# Patient Record
Sex: FEMALE | Race: WHITE | HISPANIC OR LATINO | Employment: FULL TIME | URBAN - METROPOLITAN AREA
[De-identification: names, ages, dates, MRNs, and addresses within clinical notes are randomized per-mention and may not be internally consistent; named-entity substitution may affect disease eponyms.]

---

## 2018-06-08 ENCOUNTER — HOSPITAL ENCOUNTER (OUTPATIENT)
Dept: RADIOLOGY | Facility: MEDICAL CENTER | Age: 23
End: 2018-06-08
Attending: OBSTETRICS & GYNECOLOGY
Payer: COMMERCIAL

## 2018-11-27 ENCOUNTER — HOSPITAL ENCOUNTER (OUTPATIENT)
Facility: MEDICAL CENTER | Age: 23
End: 2018-11-27
Attending: PREVENTIVE MEDICINE
Payer: COMMERCIAL

## 2018-11-27 ENCOUNTER — NON-PROVIDER VISIT (OUTPATIENT)
Dept: OCCUPATIONAL MEDICINE | Facility: CLINIC | Age: 23
End: 2018-11-27

## 2018-11-27 DIAGNOSIS — Z02.89 ENCOUNTER FOR OCCUPATIONAL HEALTH EXAMINATION: ICD-10-CM

## 2018-11-27 PROCEDURE — 86480 TB TEST CELL IMMUN MEASURE: CPT | Performed by: PREVENTIVE MEDICINE

## 2018-11-28 LAB
M TB TUBERC IFN-G BLD QL: NEGATIVE
M TB TUBERC IFN-G/MITOGEN IGNF BLD: -0
M TB TUBERC IGNF/MITOGEN IGNF CONTROL: 51.67 [IU]/ML
MITOGEN IGNF BCKGRD COR BLD-ACNC: 0.03 [IU]/ML

## 2019-03-30 ENCOUNTER — HOSPITAL ENCOUNTER (OUTPATIENT)
Dept: LAB | Facility: MEDICAL CENTER | Age: 24
End: 2019-03-30
Attending: FAMILY MEDICINE
Payer: COMMERCIAL

## 2019-03-30 LAB
25(OH)D3 SERPL-MCNC: 15 NG/ML (ref 30–100)
ALBUMIN SERPL BCP-MCNC: 4.3 G/DL (ref 3.2–4.9)
ALBUMIN/GLOB SERPL: 1.4 G/DL
ALP SERPL-CCNC: 74 U/L (ref 30–99)
ALT SERPL-CCNC: 49 U/L (ref 2–50)
ANION GAP SERPL CALC-SCNC: 9 MMOL/L (ref 0–11.9)
AST SERPL-CCNC: 34 U/L (ref 12–45)
BASOPHILS # BLD AUTO: 1.5 % (ref 0–1.8)
BASOPHILS # BLD: 0.12 K/UL (ref 0–0.12)
BILIRUB SERPL-MCNC: 0.5 MG/DL (ref 0.1–1.5)
BUN SERPL-MCNC: 8 MG/DL (ref 8–22)
CALCIUM SERPL-MCNC: 9.3 MG/DL (ref 8.5–10.5)
CHLORIDE SERPL-SCNC: 107 MMOL/L (ref 96–112)
CHOLEST SERPL-MCNC: 155 MG/DL (ref 100–199)
CO2 SERPL-SCNC: 25 MMOL/L (ref 20–33)
CREAT SERPL-MCNC: 0.73 MG/DL (ref 0.5–1.4)
EOSINOPHIL # BLD AUTO: 0.13 K/UL (ref 0–0.51)
EOSINOPHIL NFR BLD: 1.6 % (ref 0–6.9)
ERYTHROCYTE [DISTWIDTH] IN BLOOD BY AUTOMATED COUNT: 42.6 FL (ref 35.9–50)
EST. AVERAGE GLUCOSE BLD GHB EST-MCNC: 108 MG/DL
FSH SERPL-ACNC: 7 MIU/ML
GLOBULIN SER CALC-MCNC: 3 G/DL (ref 1.9–3.5)
GLUCOSE SERPL-MCNC: 87 MG/DL (ref 65–99)
HBA1C MFR BLD: 5.4 % (ref 0–5.6)
HCT VFR BLD AUTO: 46.5 % (ref 37–47)
HDLC SERPL-MCNC: 46 MG/DL
HGB BLD-MCNC: 14.5 G/DL (ref 12–16)
IMM GRANULOCYTES # BLD AUTO: 0.02 K/UL (ref 0–0.11)
IMM GRANULOCYTES NFR BLD AUTO: 0.2 % (ref 0–0.9)
LDLC SERPL CALC-MCNC: 95 MG/DL
LH SERPL-ACNC: 4 IU/L
LYMPHOCYTES # BLD AUTO: 2.87 K/UL (ref 1–4.8)
LYMPHOCYTES NFR BLD: 34.7 % (ref 22–41)
MCH RBC QN AUTO: 26.8 PG (ref 27–33)
MCHC RBC AUTO-ENTMCNC: 31.2 G/DL (ref 33.6–35)
MCV RBC AUTO: 86 FL (ref 81.4–97.8)
MONOCYTES # BLD AUTO: 0.55 K/UL (ref 0–0.85)
MONOCYTES NFR BLD AUTO: 6.7 % (ref 0–13.4)
NEUTROPHILS # BLD AUTO: 4.58 K/UL (ref 2–7.15)
NEUTROPHILS NFR BLD: 55.3 % (ref 44–72)
NRBC # BLD AUTO: 0 K/UL
NRBC BLD-RTO: 0 /100 WBC
PLATELET # BLD AUTO: 372 K/UL (ref 164–446)
PMV BLD AUTO: 10.6 FL (ref 9–12.9)
POTASSIUM SERPL-SCNC: 4.6 MMOL/L (ref 3.6–5.5)
PROT SERPL-MCNC: 7.3 G/DL (ref 6–8.2)
RBC # BLD AUTO: 5.41 M/UL (ref 4.2–5.4)
SODIUM SERPL-SCNC: 141 MMOL/L (ref 135–145)
TRIGL SERPL-MCNC: 68 MG/DL (ref 0–149)
TSH SERPL DL<=0.005 MIU/L-ACNC: 1.23 UIU/ML (ref 0.38–5.33)
WBC # BLD AUTO: 8.3 K/UL (ref 4.8–10.8)

## 2019-03-30 PROCEDURE — 82306 VITAMIN D 25 HYDROXY: CPT

## 2019-03-30 PROCEDURE — 80061 LIPID PANEL: CPT

## 2019-03-30 PROCEDURE — 80053 COMPREHEN METABOLIC PANEL: CPT

## 2019-03-30 PROCEDURE — 85025 COMPLETE CBC W/AUTO DIFF WBC: CPT

## 2019-03-30 PROCEDURE — 83001 ASSAY OF GONADOTROPIN (FSH): CPT

## 2019-03-30 PROCEDURE — 36415 COLL VENOUS BLD VENIPUNCTURE: CPT

## 2019-03-30 PROCEDURE — 83002 ASSAY OF GONADOTROPIN (LH): CPT

## 2019-03-30 PROCEDURE — 84443 ASSAY THYROID STIM HORMONE: CPT

## 2019-03-30 PROCEDURE — 83036 HEMOGLOBIN GLYCOSYLATED A1C: CPT

## 2019-04-12 ENCOUNTER — HOSPITAL ENCOUNTER (OUTPATIENT)
Dept: RADIOLOGY | Facility: MEDICAL CENTER | Age: 24
End: 2019-04-12
Attending: FAMILY MEDICINE
Payer: COMMERCIAL

## 2019-04-12 DIAGNOSIS — N92.1 EXCESSIVE AND FREQUENT MENSTRUATION WITH IRREGULAR CYCLE: ICD-10-CM

## 2019-04-12 PROCEDURE — 76830 TRANSVAGINAL US NON-OB: CPT

## 2019-05-20 ENCOUNTER — NON-PROVIDER VISIT (OUTPATIENT)
Dept: OCCUPATIONAL MEDICINE | Facility: CLINIC | Age: 24
End: 2019-05-20

## 2019-05-20 DIAGNOSIS — Z02.1 PRE-EMPLOYMENT DRUG SCREENING: ICD-10-CM

## 2019-05-20 LAB
AMP AMPHETAMINE: NORMAL
COC COCAINE: NORMAL
INT CON NEG: NORMAL
INT CON POS: NORMAL
MET METHAMPHETAMINES: NORMAL
OPI OPIATES: NORMAL
PCP PHENCYCLIDINE: NORMAL
POC DRUG COMMENT 753798-OCCUPATIONAL HEALTH: NEGATIVE
THC: NORMAL

## 2019-05-20 PROCEDURE — 80305 DRUG TEST PRSMV DIR OPT OBS: CPT | Performed by: PREVENTIVE MEDICINE

## 2019-08-27 ENCOUNTER — NON-PROVIDER VISIT (OUTPATIENT)
Dept: URGENT CARE | Facility: PHYSICIAN GROUP | Age: 24
End: 2019-08-27
Payer: COMMERCIAL

## 2019-08-27 DIAGNOSIS — Z02.1 PRE-EMPLOYMENT DRUG SCREENING: ICD-10-CM

## 2019-08-27 PROCEDURE — 80305 DRUG TEST PRSMV DIR OPT OBS: CPT | Performed by: PHYSICIAN ASSISTANT

## 2019-11-25 ENCOUNTER — EH NON-PROVIDER (OUTPATIENT)
Dept: OCCUPATIONAL MEDICINE | Facility: CLINIC | Age: 24
End: 2019-11-25

## 2019-11-25 ENCOUNTER — HOSPITAL ENCOUNTER (OUTPATIENT)
Facility: MEDICAL CENTER | Age: 24
End: 2019-11-25
Attending: PREVENTIVE MEDICINE
Payer: COMMERCIAL

## 2019-11-25 DIAGNOSIS — Z02.89 VISIT FOR OCCUPATIONAL HEALTH EXAMINATION: ICD-10-CM

## 2019-11-25 DIAGNOSIS — Z02.89 VISIT FOR OCCUPATIONAL HEALTH EXAMINATION: Primary | ICD-10-CM

## 2019-11-25 PROCEDURE — 86480 TB TEST CELL IMMUN MEASURE: CPT | Performed by: PREVENTIVE MEDICINE

## 2019-11-27 LAB
GAMMA INTERFERON BACKGROUND BLD IA-ACNC: 0.05 IU/ML
M TB IFN-G BLD-IMP: NEGATIVE
M TB IFN-G CD4+ BCKGRND COR BLD-ACNC: 0 IU/ML
MITOGEN IGNF BCKGRD COR BLD-ACNC: >10 IU/ML
QFT TB2 - NIL TBQ2: 0 IU/ML

## 2020-05-04 ENCOUNTER — HOSPITAL ENCOUNTER (OUTPATIENT)
Facility: MEDICAL CENTER | Age: 25
End: 2020-05-04
Attending: OBSTETRICS & GYNECOLOGY
Payer: COMMERCIAL

## 2020-05-04 PROCEDURE — 87077 CULTURE AEROBIC IDENTIFY: CPT

## 2020-05-04 PROCEDURE — 87086 URINE CULTURE/COLONY COUNT: CPT

## 2020-05-07 LAB
BACTERIA UR CULT: ABNORMAL
BACTERIA UR CULT: ABNORMAL
SIGNIFICANT IND 70042: ABNORMAL
SITE SITE: ABNORMAL
SOURCE SOURCE: ABNORMAL

## 2020-11-13 ENCOUNTER — NON-PROVIDER VISIT (OUTPATIENT)
Dept: OCCUPATIONAL MEDICINE | Facility: CLINIC | Age: 25
End: 2020-11-13

## 2020-11-13 ENCOUNTER — HOSPITAL ENCOUNTER (OUTPATIENT)
Facility: MEDICAL CENTER | Age: 25
End: 2020-11-13
Attending: PREVENTIVE MEDICINE
Payer: COMMERCIAL

## 2020-11-13 DIAGNOSIS — Z02.89 VISIT FOR OCCUPATIONAL HEALTH EXAMINATION: ICD-10-CM

## 2020-11-13 DIAGNOSIS — Z02.89 VISIT FOR OCCUPATIONAL HEALTH EXAMINATION: Primary | ICD-10-CM

## 2020-11-13 PROCEDURE — 86480 TB TEST CELL IMMUN MEASURE: CPT | Performed by: PREVENTIVE MEDICINE

## 2020-11-17 LAB
GAMMA INTERFERON BACKGROUND BLD IA-ACNC: 0.04 IU/ML
M TB IFN-G BLD-IMP: NEGATIVE
M TB IFN-G CD4+ BCKGRND COR BLD-ACNC: 0.02 IU/ML
MITOGEN IGNF BCKGRD COR BLD-ACNC: >10 IU/ML
QFT TB2 - NIL TBQ2: 0.01 IU/ML

## 2021-11-05 ENCOUNTER — NON-PROVIDER VISIT (OUTPATIENT)
Dept: OCCUPATIONAL MEDICINE | Facility: CLINIC | Age: 26
End: 2021-11-05

## 2021-11-05 ENCOUNTER — HOSPITAL ENCOUNTER (OUTPATIENT)
Facility: MEDICAL CENTER | Age: 26
End: 2021-11-05
Attending: NURSE PRACTITIONER
Payer: COMMERCIAL

## 2021-11-05 DIAGNOSIS — Z02.89 VISIT FOR OCCUPATIONAL HEALTH EXAMINATION: ICD-10-CM

## 2021-11-05 DIAGNOSIS — Z02.89 VISIT FOR OCCUPATIONAL HEALTH EXAMINATION: Primary | ICD-10-CM

## 2021-11-05 PROCEDURE — 86480 TB TEST CELL IMMUN MEASURE: CPT | Performed by: NURSE PRACTITIONER

## 2021-11-08 LAB
GAMMA INTERFERON BACKGROUND BLD IA-ACNC: 0.06 IU/ML
M TB IFN-G BLD-IMP: NEGATIVE
M TB IFN-G CD4+ BCKGRND COR BLD-ACNC: 0.01 IU/ML
MITOGEN IGNF BCKGRD COR BLD-ACNC: >10 IU/ML
QFT TB2 - NIL TBQ2: 0 IU/ML

## 2022-01-03 ENCOUNTER — OFFICE VISIT (OUTPATIENT)
Dept: URGENT CARE | Facility: PHYSICIAN GROUP | Age: 27
End: 2022-01-03
Payer: COMMERCIAL

## 2022-01-03 ENCOUNTER — HOSPITAL ENCOUNTER (OUTPATIENT)
Facility: MEDICAL CENTER | Age: 27
End: 2022-01-03
Attending: FAMILY MEDICINE
Payer: COMMERCIAL

## 2022-01-03 VITALS
RESPIRATION RATE: 16 BRPM | SYSTOLIC BLOOD PRESSURE: 120 MMHG | DIASTOLIC BLOOD PRESSURE: 80 MMHG | WEIGHT: 180 LBS | BODY MASS INDEX: 30.73 KG/M2 | HEIGHT: 64 IN | TEMPERATURE: 97.7 F | HEART RATE: 104 BPM | OXYGEN SATURATION: 98 %

## 2022-01-03 DIAGNOSIS — B34.9 VIRAL ILLNESS: ICD-10-CM

## 2022-01-03 PROCEDURE — 99203 OFFICE O/P NEW LOW 30 MIN: CPT | Performed by: FAMILY MEDICINE

## 2022-01-03 PROCEDURE — 0240U HCHG SARS-COV-2 COVID-19 NFCT DS RESP RNA 3 TRGT MIC: CPT

## 2022-01-03 NOTE — PROGRESS NOTES
"Subjective     Mony Kidd is a 26 y.o. female who presents with Body Aches (cough/sore throat/chills/nausea/vomiting/headaches/chest pain when coughing/Negative covid test)  Symptoms started 3 days ago.  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  Had a negative Covid test over there and was sent over for PCR testing.  Review of system otherwise negative.          HPI    ROS           Objective     /80   Pulse (!) 104   Temp 36.5 °C (97.7 °F) (Temporal)   Resp 16   Ht 1.626 m (5' 4\")   Wt 81.6 kg (180 lb)   SpO2 98%   BMI 30.90 kg/m²      Physical Exam  Constitutional:       General: She is not in acute distress.     Appearance: She is not ill-appearing, toxic-appearing or diaphoretic.   HENT:      Head: Atraumatic.      Right Ear: External ear normal.      Left Ear: External ear normal.      Nose: No rhinorrhea.      Mouth/Throat:      Mouth: Mucous membranes are moist.      Pharynx: Oropharynx is clear. No oropharyngeal exudate or posterior oropharyngeal erythema.   Cardiovascular:      Rate and Rhythm: Normal rate and regular rhythm.      Heart sounds: No murmur heard.  No friction rub. No gallop.    Pulmonary:      Effort: Pulmonary effort is normal. No respiratory distress.      Breath sounds: No stridor. No wheezing, rhonchi or rales.   Musculoskeletal:      Cervical back: Neck supple.   Lymphadenopathy:      Cervical: No cervical adenopathy.   Skin:     Coloration: Skin is not jaundiced or pale.   Neurological:      Mental Status: She is alert and oriented to person, place, and time.   Psychiatric:         Mood and Affect: Mood normal.         Behavior: Behavior normal.                Assessment & Plan        1. Viral illness  - CoV-2 and Flu A/B by PCR (24 hour In-House): Collect NP swab in VTM; Future         Warning signs reviewed  Follow up if not significantly improved as expected, sooner if any worsening or new symptoms  Continue symptomatic care          " <-- Click to add NO pertinent Family History

## 2022-01-04 LAB
FLUAV RNA SPEC QL NAA+PROBE: NEGATIVE
FLUBV RNA SPEC QL NAA+PROBE: NEGATIVE
SARS-COV-2 RNA RESP QL NAA+PROBE: NOTDETECTED
SPECIMEN SOURCE: NORMAL

## 2024-05-08 ENCOUNTER — HOSPITAL ENCOUNTER (OUTPATIENT)
Facility: MEDICAL CENTER | Age: 29
End: 2024-05-08
Attending: OBSTETRICS & GYNECOLOGY
Payer: COMMERCIAL

## 2024-05-08 LAB
ABO GROUP BLD: NORMAL
DHEA-S SERPL-MCNC: 336 UG/DL (ref 98.8–340)
PROLACTIN SERPL-MCNC: 6.91 NG/ML (ref 2.8–26)
RH BLD: NORMAL
RUBV AB SER QL: 16.8 IU/ML
TESTOST SERPL-MCNC: 39 NG/DL (ref 9–75)
TSH SERPL DL<=0.005 MIU/L-ACNC: 2 UIU/ML (ref 0.38–5.33)

## 2024-05-09 ENCOUNTER — HOSPITAL ENCOUNTER (OUTPATIENT)
Dept: LAB | Facility: MEDICAL CENTER | Age: 29
End: 2024-05-09
Attending: OBSTETRICS & GYNECOLOGY
Payer: COMMERCIAL

## 2024-05-11 LAB
GLUCOSE 1H P CHAL SERPL-MCNC: 160 MG/DL (ref 65–199)
GLUCOSE 2H P CHAL SERPL-MCNC: 101 MG/DL (ref 65–139)
GLUCOSE BS SERPL-MCNC: 86 MG/DL (ref 65–99)
INSULIN 1H P CHAL SERPL-ACNC: 371 UIU/ML (ref 29–88)
INSULIN 2H P CHAL SERPL-ACNC: 629 UIU/ML (ref 22–79)
INSULIN P FAST SERPL-ACNC: 39 UIU/ML (ref 3–25)
MIS SERPL-MCNC: 3.72 NG/ML (ref 0.18–11.71)

## 2024-06-19 ENCOUNTER — HOSPITAL ENCOUNTER (OUTPATIENT)
Dept: LAB | Facility: MEDICAL CENTER | Age: 29
End: 2024-06-19
Attending: OBSTETRICS & GYNECOLOGY
Payer: COMMERCIAL

## 2024-06-19 LAB
C TRACH DNA SPEC QL NAA+PROBE: NEGATIVE
HBV SURFACE AB SERPL IA-ACNC: <3.5 MIU/ML (ref 0–10)
HBV SURFACE AG SER QL: NORMAL
HCV AB SER QL: NORMAL
HIV 1+2 AB+HIV1 P24 AG SERPL QL IA: NORMAL
N GONORRHOEA DNA SPEC QL NAA+PROBE: NEGATIVE
SPECIMEN SOURCE: NORMAL
T PALLIDUM AB SER QL IA: NORMAL

## 2024-06-19 PROCEDURE — 36415 COLL VENOUS BLD VENIPUNCTURE: CPT

## 2024-06-19 PROCEDURE — 87491 CHLMYD TRACH DNA AMP PROBE: CPT

## 2024-06-19 PROCEDURE — 87340 HEPATITIS B SURFACE AG IA: CPT

## 2024-06-19 PROCEDURE — 86706 HEP B SURFACE ANTIBODY: CPT

## 2024-06-19 PROCEDURE — 86803 HEPATITIS C AB TEST: CPT

## 2024-06-19 PROCEDURE — 87389 HIV-1 AG W/HIV-1&-2 AB AG IA: CPT

## 2024-06-19 PROCEDURE — 86780 TREPONEMA PALLIDUM: CPT

## 2024-06-19 PROCEDURE — 87591 N.GONORRHOEAE DNA AMP PROB: CPT

## 2024-07-17 ENCOUNTER — HOSPITAL ENCOUNTER (OUTPATIENT)
Dept: LAB | Facility: MEDICAL CENTER | Age: 29
End: 2024-07-17
Attending: OBSTETRICS & GYNECOLOGY
Payer: COMMERCIAL

## 2024-07-17 LAB — B-HCG SERPL-ACNC: <1 MIU/ML (ref 0–5)

## 2024-07-17 PROCEDURE — 36415 COLL VENOUS BLD VENIPUNCTURE: CPT

## 2024-07-17 PROCEDURE — 84702 CHORIONIC GONADOTROPIN TEST: CPT

## 2024-12-10 ENCOUNTER — HOSPITAL ENCOUNTER (OUTPATIENT)
Dept: LAB | Facility: MEDICAL CENTER | Age: 29
End: 2024-12-10
Attending: OBSTETRICS & GYNECOLOGY
Payer: COMMERCIAL

## 2024-12-10 LAB — B-HCG SERPL-ACNC: 73.2 MIU/ML (ref 0–5)

## 2024-12-10 PROCEDURE — 36415 COLL VENOUS BLD VENIPUNCTURE: CPT

## 2024-12-10 PROCEDURE — 84702 CHORIONIC GONADOTROPIN TEST: CPT

## 2024-12-12 ENCOUNTER — HOSPITAL ENCOUNTER (OUTPATIENT)
Dept: LAB | Facility: MEDICAL CENTER | Age: 29
End: 2024-12-12
Attending: OBSTETRICS & GYNECOLOGY
Payer: COMMERCIAL

## 2024-12-12 LAB — B-HCG SERPL-ACNC: 198 MIU/ML (ref 0–5)

## 2024-12-12 PROCEDURE — 84702 CHORIONIC GONADOTROPIN TEST: CPT

## 2024-12-12 PROCEDURE — 36415 COLL VENOUS BLD VENIPUNCTURE: CPT

## 2025-02-07 ENCOUNTER — HOSPITAL ENCOUNTER (OUTPATIENT)
Dept: LAB | Facility: MEDICAL CENTER | Age: 30
End: 2025-02-07
Attending: OBSTETRICS & GYNECOLOGY
Payer: COMMERCIAL

## 2025-02-07 LAB
ABO GROUP BLD: NORMAL
BLD GP AB SCN SERPL QL: NORMAL
ERYTHROCYTE [DISTWIDTH] IN BLOOD BY AUTOMATED COUNT: 42.3 FL (ref 35.9–50)
EST. AVERAGE GLUCOSE BLD GHB EST-MCNC: 114 MG/DL
GLUCOSE 1H P 50 G GLC PO SERPL-MCNC: 161 MG/DL (ref 70–139)
HBA1C MFR BLD: 5.6 % (ref 4–5.6)
HBV SURFACE AG SER QL: ABNORMAL
HCT VFR BLD AUTO: 43.1 % (ref 37–47)
HCV AB SER QL: ABNORMAL
HGB BLD-MCNC: 13.7 G/DL (ref 12–16)
HIV 1+2 AB+HIV1 P24 AG SERPL QL IA: NORMAL
MCH RBC QN AUTO: 27 PG (ref 27–33)
MCHC RBC AUTO-ENTMCNC: 31.8 G/DL (ref 32.2–35.5)
MCV RBC AUTO: 84.8 FL (ref 81.4–97.8)
PLATELET # BLD AUTO: 292 K/UL (ref 164–446)
PMV BLD AUTO: 11.5 FL (ref 9–12.9)
RBC # BLD AUTO: 5.08 M/UL (ref 4.2–5.4)
RH BLD: NORMAL
RUBV AB SER QL: 12.2 IU/ML
T PALLIDUM AB SER QL IA: ABNORMAL
WBC # BLD AUTO: 11 K/UL (ref 4.8–10.8)

## 2025-02-07 PROCEDURE — 86762 RUBELLA ANTIBODY: CPT

## 2025-02-07 PROCEDURE — 82950 GLUCOSE TEST: CPT

## 2025-02-07 PROCEDURE — 86780 TREPONEMA PALLIDUM: CPT

## 2025-02-07 PROCEDURE — 86900 BLOOD TYPING SEROLOGIC ABO: CPT

## 2025-02-07 PROCEDURE — 85027 COMPLETE CBC AUTOMATED: CPT

## 2025-02-07 PROCEDURE — 36415 COLL VENOUS BLD VENIPUNCTURE: CPT

## 2025-02-07 PROCEDURE — 87086 URINE CULTURE/COLONY COUNT: CPT

## 2025-02-07 PROCEDURE — 83036 HEMOGLOBIN GLYCOSYLATED A1C: CPT

## 2025-02-07 PROCEDURE — 87340 HEPATITIS B SURFACE AG IA: CPT

## 2025-02-07 PROCEDURE — 86787 VARICELLA-ZOSTER ANTIBODY: CPT | Mod: 91

## 2025-02-07 PROCEDURE — 87389 HIV-1 AG W/HIV-1&-2 AB AG IA: CPT

## 2025-02-07 PROCEDURE — 86850 RBC ANTIBODY SCREEN: CPT

## 2025-02-07 PROCEDURE — 86803 HEPATITIS C AB TEST: CPT

## 2025-02-07 PROCEDURE — 86901 BLOOD TYPING SEROLOGIC RH(D): CPT

## 2025-02-09 LAB
BACTERIA UR CULT: NORMAL
SIGNIFICANT IND 70042: NORMAL
SITE SITE: NORMAL
SOURCE SOURCE: NORMAL

## 2025-02-10 LAB
VZV IGG SER IA-ACNC: 9 S/CO
VZV IGM SER IA-ACNC: 0.72 ISR

## 2025-02-14 ENCOUNTER — HOSPITAL ENCOUNTER (OUTPATIENT)
Dept: LAB | Facility: MEDICAL CENTER | Age: 30
End: 2025-02-14
Attending: OBSTETRICS & GYNECOLOGY
Payer: COMMERCIAL

## 2025-02-15 ENCOUNTER — HOSPITAL ENCOUNTER (OUTPATIENT)
Dept: LAB | Facility: MEDICAL CENTER | Age: 30
End: 2025-02-15
Attending: OBSTETRICS & GYNECOLOGY
Payer: COMMERCIAL

## 2025-02-15 LAB
GLUCOSE 1H P CHAL SERPL-MCNC: 170 MG/DL (ref 65–180)
GLUCOSE 2H P CHAL SERPL-MCNC: 175 MG/DL (ref 65–155)
GLUCOSE 3H P CHAL SERPL-MCNC: 138 MG/DL (ref 65–140)
GLUCOSE BS SERPL-MCNC: 81 MG/DL (ref 65–95)

## 2025-02-15 PROCEDURE — 36415 COLL VENOUS BLD VENIPUNCTURE: CPT

## 2025-02-15 PROCEDURE — 82952 GTT-ADDED SAMPLES: CPT

## 2025-02-15 PROCEDURE — 82951 GLUCOSE TOLERANCE TEST (GTT): CPT

## 2025-04-30 ENCOUNTER — APPOINTMENT (OUTPATIENT)
Dept: RADIOLOGY | Facility: MEDICAL CENTER | Age: 30
End: 2025-04-30
Attending: OBSTETRICS & GYNECOLOGY
Payer: COMMERCIAL

## 2025-04-30 ENCOUNTER — HOSPITAL ENCOUNTER (INPATIENT)
Facility: MEDICAL CENTER | Age: 30
LOS: 3 days | End: 2025-05-03
Attending: OBSTETRICS & GYNECOLOGY | Admitting: OBSTETRICS & GYNECOLOGY
Payer: COMMERCIAL

## 2025-04-30 PROBLEM — O47.00 PRETERM CONTRACTIONS: Status: ACTIVE | Noted: 2025-04-30

## 2025-04-30 LAB
A1 MICROGLOB PLACENTAL VAG QL: NEGATIVE
ABO GROUP BLD: NORMAL
APPEARANCE UR: ABNORMAL
BACTERIA GENITAL QL WET PREP: NORMAL
BASOPHILS # BLD AUTO: 0.4 % (ref 0–1.8)
BASOPHILS # BLD: 0.07 K/UL (ref 0–0.12)
BILIRUB UR QL STRIP.AUTO: NEGATIVE
BLD GP AB SCN SERPL QL: NORMAL
COLOR UR AUTO: ABNORMAL
EOSINOPHIL # BLD AUTO: 0.02 K/UL (ref 0–0.51)
EOSINOPHIL NFR BLD: 0.1 % (ref 0–6.9)
ERYTHROCYTE [DISTWIDTH] IN BLOOD BY AUTOMATED COUNT: 39.5 FL (ref 35.9–50)
GLUCOSE BLD STRIP.AUTO-MCNC: 115 MG/DL (ref 65–99)
GLUCOSE UR QL STRIP.AUTO: NEGATIVE MG/DL
GP B STREP DNA SPEC QL NAA+PROBE: POSITIVE
HCT VFR BLD AUTO: 37.5 % (ref 37–47)
HGB BLD-MCNC: 12.2 G/DL (ref 12–16)
IMM GRANULOCYTES # BLD AUTO: 0.15 K/UL (ref 0–0.11)
IMM GRANULOCYTES NFR BLD AUTO: 0.8 % (ref 0–0.9)
KETONES UR QL STRIP.AUTO: NEGATIVE MG/DL
LEUKOCYTE ESTERASE UR QL STRIP.AUTO: ABNORMAL
LYMPHOCYTES # BLD AUTO: 1.73 K/UL (ref 1–4.8)
LYMPHOCYTES NFR BLD: 9 % (ref 22–41)
MCH RBC QN AUTO: 26.4 PG (ref 27–33)
MCHC RBC AUTO-ENTMCNC: 32.5 G/DL (ref 32.2–35.5)
MCV RBC AUTO: 81.2 FL (ref 81.4–97.8)
MONOCYTES # BLD AUTO: 0.89 K/UL (ref 0–0.85)
MONOCYTES NFR BLD AUTO: 4.6 % (ref 0–13.4)
NEUTROPHILS # BLD AUTO: 16.43 K/UL (ref 1.82–7.42)
NEUTROPHILS NFR BLD: 85.1 % (ref 44–72)
NITRITE UR QL STRIP.AUTO: NEGATIVE
NRBC # BLD AUTO: 0 K/UL
NRBC BLD-RTO: 0 /100 WBC (ref 0–0.2)
PH UR STRIP.AUTO: 6 [PH] (ref 5–8)
PLATELET # BLD AUTO: 320 K/UL (ref 164–446)
PMV BLD AUTO: 11 FL (ref 9–12.9)
PROT UR QL STRIP: 100 MG/DL
RBC # BLD AUTO: 4.62 M/UL (ref 4.2–5.4)
RBC UR QL AUTO: ABNORMAL
RH BLD: NORMAL
SIGNIFICANT IND 70042: NORMAL
SITE SITE: NORMAL
SOURCE SOURCE: NORMAL
SP GR UR STRIP.AUTO: 1.02 (ref 1–1.03)
T PALLIDUM AB SER QL IA: NORMAL
UROBILINOGEN UR STRIP.AUTO-MCNC: 1 MG/DL
WBC # BLD AUTO: 19.3 K/UL (ref 4.8–10.8)

## 2025-04-30 PROCEDURE — 36415 COLL VENOUS BLD VENIPUNCTURE: CPT

## 2025-04-30 PROCEDURE — 86901 BLOOD TYPING SEROLOGIC RH(D): CPT

## 2025-04-30 PROCEDURE — 86850 RBC ANTIBODY SCREEN: CPT

## 2025-04-30 PROCEDURE — A9270 NON-COVERED ITEM OR SERVICE: HCPCS | Performed by: OBSTETRICS & GYNECOLOGY

## 2025-04-30 PROCEDURE — 700105 HCHG RX REV CODE 258: Performed by: OBSTETRICS & GYNECOLOGY

## 2025-04-30 PROCEDURE — 99284 EMERGENCY DEPT VISIT MOD MDM: CPT

## 2025-04-30 PROCEDURE — 81002 URINALYSIS NONAUTO W/O SCOPE: CPT

## 2025-04-30 PROCEDURE — 302790 HCHG STAT ANTEPARTUM CARE, DAILY

## 2025-04-30 PROCEDURE — 82962 GLUCOSE BLOOD TEST: CPT

## 2025-04-30 PROCEDURE — 700101 HCHG RX REV CODE 250: Performed by: OBSTETRICS & GYNECOLOGY

## 2025-04-30 PROCEDURE — 87150 DNA/RNA AMPLIFIED PROBE: CPT

## 2025-04-30 PROCEDURE — 76816 OB US FOLLOW-UP PER FETUS: CPT

## 2025-04-30 PROCEDURE — 700111 HCHG RX REV CODE 636 W/ 250 OVERRIDE (IP)

## 2025-04-30 PROCEDURE — 86780 TREPONEMA PALLIDUM: CPT

## 2025-04-30 PROCEDURE — 76817 TRANSVAGINAL US OBSTETRIC: CPT

## 2025-04-30 PROCEDURE — 85025 COMPLETE CBC W/AUTO DIFF WBC: CPT

## 2025-04-30 PROCEDURE — 770002 HCHG ROOM/CARE - OB PRIVATE (112)

## 2025-04-30 PROCEDURE — 87077 CULTURE AEROBIC IDENTIFY: CPT

## 2025-04-30 PROCEDURE — 700111 HCHG RX REV CODE 636 W/ 250 OVERRIDE (IP): Performed by: OBSTETRICS & GYNECOLOGY

## 2025-04-30 PROCEDURE — 700102 HCHG RX REV CODE 250 W/ 637 OVERRIDE(OP): Performed by: OBSTETRICS & GYNECOLOGY

## 2025-04-30 PROCEDURE — 84112 EVAL AMNIOTIC FLUID PROTEIN: CPT

## 2025-04-30 PROCEDURE — 86900 BLOOD TYPING SEROLOGIC ABO: CPT

## 2025-04-30 PROCEDURE — 87086 URINE CULTURE/COLONY COUNT: CPT

## 2025-04-30 PROCEDURE — 87081 CULTURE SCREEN ONLY: CPT

## 2025-04-30 RX ORDER — SODIUM CHLORIDE, SODIUM LACTATE, POTASSIUM CHLORIDE, CALCIUM CHLORIDE 600; 310; 30; 20 MG/100ML; MG/100ML; MG/100ML; MG/100ML
INJECTION, SOLUTION INTRAVENOUS CONTINUOUS
Status: DISCONTINUED | OUTPATIENT
Start: 2025-04-30 | End: 2025-05-01

## 2025-04-30 RX ORDER — IBUPROFEN 800 MG/1
800 TABLET, FILM COATED ORAL
Status: DISCONTINUED | OUTPATIENT
Start: 2025-04-30 | End: 2025-05-01

## 2025-04-30 RX ORDER — MAGNESIUM SULFATE HEPTAHYDRATE 40 MG/ML
2 INJECTION, SOLUTION INTRAVENOUS CONTINUOUS
Status: DISCONTINUED | OUTPATIENT
Start: 2025-04-30 | End: 2025-04-30

## 2025-04-30 RX ORDER — INDOMETHACIN 50 MG/1
50 CAPSULE ORAL ONCE
Status: COMPLETED | OUTPATIENT
Start: 2025-04-30 | End: 2025-04-30

## 2025-04-30 RX ORDER — PENICILLIN G POTASSIUM 5000000 [IU]/1
INJECTION, POWDER, FOR SOLUTION INTRAMUSCULAR; INTRAVENOUS
Status: COMPLETED
Start: 2025-04-30 | End: 2025-04-30

## 2025-04-30 RX ORDER — MAGNESIUM SULFATE HEPTAHYDRATE 40 MG/ML
INJECTION, SOLUTION INTRAVENOUS
Status: COMPLETED
Start: 2025-04-30 | End: 2025-04-30

## 2025-04-30 RX ORDER — ACETAMINOPHEN 500 MG
1000 TABLET ORAL
Status: DISCONTINUED | OUTPATIENT
Start: 2025-04-30 | End: 2025-05-01

## 2025-04-30 RX ORDER — OXYTOCIN 10 [USP'U]/ML
10 INJECTION, SOLUTION INTRAMUSCULAR; INTRAVENOUS
Status: DISCONTINUED | OUTPATIENT
Start: 2025-04-30 | End: 2025-05-01

## 2025-04-30 RX ORDER — INDOMETHACIN 25 MG/1
25 CAPSULE ORAL EVERY 6 HOURS
Status: DISCONTINUED | OUTPATIENT
Start: 2025-04-30 | End: 2025-05-01

## 2025-04-30 RX ORDER — CALCIUM GLUCONATE 98 MG/ML
1 INJECTION, SOLUTION INTRAVENOUS
Status: DISCONTINUED | OUTPATIENT
Start: 2025-04-30 | End: 2025-05-01

## 2025-04-30 RX ORDER — MAGNESIUM SULFATE HEPTAHYDRATE 40 MG/ML
6 INJECTION, SOLUTION INTRAVENOUS ONCE
Status: DISCONTINUED | OUTPATIENT
Start: 2025-04-30 | End: 2025-04-30

## 2025-04-30 RX ORDER — LIDOCAINE HYDROCHLORIDE 10 MG/ML
20 INJECTION, SOLUTION INFILTRATION; PERINEURAL
Status: DISCONTINUED | OUTPATIENT
Start: 2025-04-30 | End: 2025-05-01

## 2025-04-30 RX ORDER — TERBUTALINE SULFATE 1 MG/ML
0.25 INJECTION SUBCUTANEOUS
Status: DISCONTINUED | OUTPATIENT
Start: 2025-04-30 | End: 2025-05-01

## 2025-04-30 RX ORDER — SODIUM CHLORIDE, SODIUM LACTATE, POTASSIUM CHLORIDE, CALCIUM CHLORIDE 600; 310; 30; 20 MG/100ML; MG/100ML; MG/100ML; MG/100ML
INJECTION, SOLUTION INTRAVENOUS CONTINUOUS
Status: DISCONTINUED | OUTPATIENT
Start: 2025-04-30 | End: 2025-04-30

## 2025-04-30 RX ORDER — MAGNESIUM SULFATE HEPTAHYDRATE 40 MG/ML
2 INJECTION, SOLUTION INTRAVENOUS ONCE
Status: COMPLETED | OUTPATIENT
Start: 2025-04-30 | End: 2025-04-30

## 2025-04-30 RX ORDER — MAGNESIUM SULFATE HEPTAHYDRATE 40 MG/ML
4 INJECTION, SOLUTION INTRAVENOUS ONCE
Status: COMPLETED | OUTPATIENT
Start: 2025-04-30 | End: 2025-04-30

## 2025-04-30 RX ORDER — SODIUM CHLORIDE 9 MG/ML
INJECTION, SOLUTION INTRAVENOUS
Status: ACTIVE
Start: 2025-04-30 | End: 2025-04-30

## 2025-04-30 RX ORDER — BETAMETHASONE SODIUM PHOSPHATE AND BETAMETHASONE ACETATE 3; 3 MG/ML; MG/ML
12 INJECTION, SUSPENSION INTRA-ARTICULAR; INTRALESIONAL; INTRAMUSCULAR; SOFT TISSUE EVERY 24 HOURS
Status: COMPLETED | OUTPATIENT
Start: 2025-04-30 | End: 2025-05-01

## 2025-04-30 RX ORDER — MAGNESIUM SULFATE HEPTAHYDRATE 40 MG/ML
2 INJECTION, SOLUTION INTRAVENOUS CONTINUOUS
Status: DISPENSED | OUTPATIENT
Start: 2025-04-30 | End: 2025-05-01

## 2025-04-30 RX ORDER — BETAMETHASONE SODIUM PHOSPHATE AND BETAMETHASONE ACETATE 3; 3 MG/ML; MG/ML
12 INJECTION, SUSPENSION INTRA-ARTICULAR; INTRALESIONAL; INTRAMUSCULAR; SOFT TISSUE EVERY 24 HOURS
Status: DISCONTINUED | OUTPATIENT
Start: 2025-04-30 | End: 2025-04-30

## 2025-04-30 RX ORDER — VITAMIN A ACETATE, BETA CAROTENE, ASCORBIC ACID, CHOLECALCIFEROL, .ALPHA.-TOCOPHEROL ACETATE, DL-, THIAMINE MONONITRATE, RIBOFLAVIN, NIACINAMIDE, PYRIDOXINE HYDROCHLORIDE, FOLIC ACID, CYANOCOBALAMIN, CALCIUM CARBONATE, FERROUS FUMARATE, ZINC OXIDE, CUPRIC OXIDE 3080; 12; 120; 400; 1; 1.84; 3; 20; 22; 920; 25; 200; 27; 10; 2 [IU]/1; UG/1; MG/1; [IU]/1; MG/1; MG/1; MG/1; MG/1; MG/1; [IU]/1; MG/1; MG/1; MG/1; MG/1; MG/1
1 TABLET, FILM COATED ORAL
Status: DISCONTINUED | OUTPATIENT
Start: 2025-05-01 | End: 2025-05-01

## 2025-04-30 RX ADMIN — MAGNESIUM SULFATE HEPTAHYDRATE 4 G: 40 INJECTION, SOLUTION INTRAVENOUS at 09:58

## 2025-04-30 RX ADMIN — WATER 2.5 MILLION UNITS: 1 INJECTION INTRAMUSCULAR; INTRAVENOUS; SUBCUTANEOUS at 22:22

## 2025-04-30 RX ADMIN — METFORMIN HYDROCHLORIDE 1000 MG: 500 TABLET ORAL at 18:40

## 2025-04-30 RX ADMIN — MAGNESIUM SULFATE IN WATER 2 G/HR: 40 INJECTION, SOLUTION INTRAVENOUS at 10:32

## 2025-04-30 RX ADMIN — SODIUM CHLORIDE, POTASSIUM CHLORIDE, SODIUM LACTATE AND CALCIUM CHLORIDE: 600; 310; 30; 20 INJECTION, SOLUTION INTRAVENOUS at 09:59

## 2025-04-30 RX ADMIN — MAGNESIUM SULFATE HEPTAHYDRATE 2 G: 2 INJECTION, SOLUTION INTRAVENOUS at 10:20

## 2025-04-30 RX ADMIN — MAGNESIUM SULFATE HEPTAHYDRATE 4 G: 4 INJECTION, SOLUTION INTRAVENOUS at 09:58

## 2025-04-30 RX ADMIN — PENICILLIN G POTASSIUM 5 MILLION UNITS: 5000000 POWDER, FOR SOLUTION INTRAMUSCULAR; INTRAPLEURAL; INTRATHECAL; INTRAVENOUS at 10:22

## 2025-04-30 RX ADMIN — INDOMETHACIN 50 MG: 50 CAPSULE ORAL at 10:05

## 2025-04-30 RX ADMIN — BETAMETHASONE SODIUM PHOSPHATE AND BETAMETHASONE ACETATE 12 MG: 3; 3 INJECTION, SUSPENSION INTRA-ARTICULAR; INTRALESIONAL; INTRAMUSCULAR at 10:17

## 2025-04-30 RX ADMIN — SODIUM CHLORIDE 5 MILLION UNITS: 900 INJECTION INTRAVENOUS at 10:24

## 2025-04-30 RX ADMIN — WATER 2.5 MILLION UNITS: 1 INJECTION INTRAMUSCULAR; INTRAVENOUS; SUBCUTANEOUS at 18:08

## 2025-04-30 RX ADMIN — INDOMETHACIN 25 MG: 25 CAPSULE ORAL at 22:20

## 2025-04-30 RX ADMIN — WATER 2.5 MILLION UNITS: 1 INJECTION INTRAMUSCULAR; INTRAVENOUS; SUBCUTANEOUS at 14:07

## 2025-04-30 RX ADMIN — INDOMETHACIN 25 MG: 25 CAPSULE ORAL at 16:15

## 2025-04-30 SDOH — ECONOMIC STABILITY: TRANSPORTATION INSECURITY
IN THE PAST 12 MONTHS, HAS THE LACK OF TRANSPORTATION KEPT YOU FROM MEDICAL APPOINTMENTS OR FROM GETTING MEDICATIONS?: NO

## 2025-04-30 SDOH — ECONOMIC STABILITY: TRANSPORTATION INSECURITY
IN THE PAST 12 MONTHS, HAS LACK OF RELIABLE TRANSPORTATION KEPT YOU FROM MEDICAL APPOINTMENTS, MEETINGS, WORK OR FROM GETTING THINGS NEEDED FOR DAILY LIVING?: NO

## 2025-04-30 ASSESSMENT — SOCIAL DETERMINANTS OF HEALTH (SDOH)
IN THE PAST 12 MONTHS, HAS THE ELECTRIC, GAS, OIL, OR WATER COMPANY THREATENED TO SHUT OFF SERVICE IN YOUR HOME?: NO
WITHIN THE LAST YEAR, HAVE YOU BEEN HUMILIATED OR EMOTIONALLY ABUSED IN OTHER WAYS BY YOUR PARTNER OR EX-PARTNER?: NO
WITHIN THE PAST 12 MONTHS, YOU WORRIED THAT YOUR FOOD WOULD RUN OUT BEFORE YOU GOT THE MONEY TO BUY MORE: NEVER TRUE
WITHIN THE LAST YEAR, HAVE YOU BEEN AFRAID OF YOUR PARTNER OR EX-PARTNER?: NO
WITHIN THE LAST YEAR, HAVE TO BEEN RAPED OR FORCED TO HAVE ANY KIND OF SEXUAL ACTIVITY BY YOUR PARTNER OR EX-PARTNER?: NO
WITHIN THE LAST YEAR, HAVE YOU BEEN KICKED, HIT, SLAPPED, OR OTHERWISE PHYSICALLY HURT BY YOUR PARTNER OR EX-PARTNER?: NO

## 2025-04-30 ASSESSMENT — PATIENT HEALTH QUESTIONNAIRE - PHQ9
1. LITTLE INTEREST OR PLEASURE IN DOING THINGS: NOT AT ALL
2. FEELING DOWN, DEPRESSED, IRRITABLE, OR HOPELESS: NOT AT ALL
SUM OF ALL RESPONSES TO PHQ9 QUESTIONS 1 AND 2: 0

## 2025-04-30 ASSESSMENT — PAIN SCALES - GENERAL: PAINLEVEL: 8

## 2025-04-30 ASSESSMENT — LIFESTYLE VARIABLES: ALCOHOL_USE: NO

## 2025-04-30 NOTE — PROGRESS NOTES
The patient reports that she is feeling much better. She hasn't had a contraction in about an hour. T Cat 1. Still waiting for NICU consultation. Will continue with HALIMA Howard, Becky and Pablito Boone M.D.

## 2025-04-30 NOTE — CARE PLAN
Problem: Risk for Infection and Impaired Wound Healing  Goal: Patient will remain free from infection  Outcome: Progressing  Note: VSS. No signs or symptoms of infection at this time. GBS status unknown, being treated prophylactically.     Problem: Pain  Goal: Patient's pain will be alleviated or reduced to the patient’s comfort goal  Outcome: Progressing  Note: Patient coping well with  labor contractions at this time. Education given on pain management options. Patient desires no interventions for pain management.    The patient is Stable - Low risk of patient condition declining or worsening    Shift Goals  Clinical Goals: slow contractions  Patient Goals: stay pregnant, safe delivery  Family Goals: education, emotional support

## 2025-04-30 NOTE — PROGRESS NOTES
0945 Assumed care of patient. Transfer from [facility] to s212 for  labor. Orders received from Dr. Boone. POC discussed with patient for admission and treatment. Admission profile completed.     1715 Dr. Grimes at bedside to discuss POC for infant if delivery occurs. Patient would like full resuscitation at this time. See provider note.    070 Report given to REAGAN Garcia. POC discussed, questions answered.

## 2025-04-30 NOTE — PROGRESS NOTES
0524: Pt is a G 1 P 0 EDC 8/19 @ 24.1w. Presents to L&D triage for contractions since monday night. Pt states + FM, brown spotting, - LOF. External monitors applied. Questions and concerns answered. Call light in reach.    0543: MD Preston notified of pt arrival. Orders received.

## 2025-04-30 NOTE — H&P
Date 2025    Patient ID: 29-year-old  1 para 0 at 24+1 weeks (EDC 2025)    Chief concern: Contractions and vaginal spotting.    History of present illness: The patient has prenatal care with Dr. El.  Her pregnancy has been complicated by insulin resistance, requiring letrozole for ovulation induction.    She reports that on  she had intercourse and had spotting which progressed to brown discharge throughout .  She states that on Monday night she started having cramping that was mild.  She states that the cramping has continued throughout the week and progressively became severe this morning.  She presented to triage this morning with concerns of contraction-like pain.  She states that she has not had any additional bright red bleeding.  She denies loss of fluid.  She endorses positive fetal movement.  She denies abdominal trauma.  She has not had any dysuria or hematuria.  She is here with her mother and her partner at bedside.  She has no other concerns.    Past medical history: Migraine headaches and depression.    Past surgical history: Denies.    Medications: Prenatal vitamins,  metformin    Family history:   - Paternal grandmother with hypertension  - Maternal grandmother with Hodgkin's lymphoma  - Maternal aunt with type 2 diabetes  - Uncle with type 2 diabetes, hyperlipidemia and lupus.    Social history: Denies tobacco use, alcohol use or drug use.  The patient's partner's name is Navneet.    GYN history: Unknown.  Denies history of sexually-transmitted infections or genital herpes.    OB history: G1: Current.    Allergies: No known drug allergies.    Physical exam:  Vitals:    25 0527   BP: 119/69   Pulse: (!) 115   Temp: 36.2 °C (97.2 °F)   SpO2: 96%   General: Alert, conversational, pleasant, no acute dress  Cardiovascular: Regular rate  Pulmonary: Respiratory distress, symmetric expansion  Abdomen: Soft, nontender, nondistended, gravid  Genitourinary: 4 to 5 cm / 80%  effaced/-1 fetal station  Extremities: Moves all, no edema    NST: Baseline 160s, moderate variability, no accelerations, small variable decelerations, tocometer quiet.    Laboratory studies: Laboratory studies: Prenatal labs reviewed: O+, antibody negative, hepatitis C antibody nonreactive, hepatitis B surface antigen nonreactive, rubella immune, RPR nonreactive, HIV nonreactive, hemoglobin A1c 5.6, early 1 hour glucose tolerance test 161, 3-hour glucose tolerance test normal with an elevated 2-hour of 175, urine culture negative, varicella immune, Pap smear in 2025 NILM, HPV negative, gonorrhea/chlamydia/trichomonas negative, NIPT low risk.    Imaging: Anatomy ultrasound report shows a single live intrauterine pregnancy with normal anatomy. Growth ultrasound today at 24+1 weeks shows an EFW of 743 g, EFW 74%, AC 82%, DARYL 10.1cm.     Assessment/plan:  29-year-old  1 para 0 at 24+1 weeks (EDC 2025)   intrauterine pregnancy at 24+1 weeks.   labor with advanced cervical dilation.  GBS unknown.  Insulin resistance.    Discussion: The patient was seen and evaluated at bedside.  She is very uncomfortable with painful contractions which have progressively worsened.  Her sterile vaginal exam is 4 to 5 cm dilated.  We discussed that her intermittent is significantly premature and the EFW is 743 g.  We discussed the option of expectant management versus magnesium sulfate for fetal neuroprotection and betamethasone for fetal lung maturity.  The patient and her partner desire full interventions.  The infant is vertex at this time.  The patient declines an epidural.  NICU was consulted for additional morbidity and mortality counseling due to the gestational age.  I spoke with M Dr. Lobo and he has recommended Indocin for toco lysis.  The patient's DARYL is 10.1 cm.  Penicillin G for GBS prophylaxis will also be initiated once a GBS swab is collected.     Plan:  Admit to labor and  delivery.  Betamethasone for fetal lung maturity.  Magnesium sulfate for fetal neuroprotection.  Penicillin G for GBS unknown status and  labor.  Sterile vaginal exam as needed.  Epidural as needed for pain.  The patient is currently declining.  CBC/type and screen.  The patient may warrant blood sugar checks if she does not deliver precipitously given her history of insulin resistance and betamethasone administration.  NICU consultation pending.    Savita Boone MD

## 2025-04-30 NOTE — ED PROVIDER NOTES
"OB ED Note    CC: Cramping, spotting    HPI:Mony Kidd is a 29-year-old G1, P0 who presents today 24 weeks and 1 day with complaints of cramping and spotting.  She had intercourse with her spouse on .  She noticed vaginal discharge that was lighter red and then dark brown.  She denies leaking fluid.  She reports cramping that started Monday night.  It is gotten progressively worse overnight.  She denies pelvic pressure.  She denies dysuria, urgency, or frequency of urination.    ROS: All other systems reviewed were vitamin negative    /69   Pulse (!) 115   Temp 36.2 °C (97.2 °F) (Temporal)   Ht 1.626 m (5' 4\")   Wt 86.2 kg (190 lb)   SpO2 96%   BMI 32.61 kg/m²     General: Tearful but no apparent distress  Abdomen: Gravid, nontender  Extremities: No edema    FHT: Baseline of 150s, moderate variability present, accelerations present, decelerations absent  Tocometer with contractions initially every 2 to 3 minutes    Assessment:   IUP at 24 weeks 1 day  Threatened  labor    Plan:  TVUS for cervical length  FFN ordered.  Please send if cervical length less than 3 cm  Wet prep ordered    Checkout given to Dr. Paolo Caballero pending results    Vanda Johnston M.D.    "

## 2025-05-01 ENCOUNTER — APPOINTMENT (OUTPATIENT)
Dept: RADIOLOGY | Facility: MEDICAL CENTER | Age: 30
End: 2025-05-01
Attending: OBSTETRICS & GYNECOLOGY
Payer: COMMERCIAL

## 2025-05-01 LAB
A1 MICROGLOB PLACENTAL VAG QL: NEGATIVE
ERYTHROCYTE [DISTWIDTH] IN BLOOD BY AUTOMATED COUNT: 40 FL (ref 35.9–50)
GLUCOSE BLD STRIP.AUTO-MCNC: 130 MG/DL (ref 65–99)
GLUCOSE BLD STRIP.AUTO-MCNC: 131 MG/DL (ref 65–99)
GLUCOSE BLD STRIP.AUTO-MCNC: 143 MG/DL (ref 65–99)
HCT VFR BLD AUTO: 35.9 % (ref 37–47)
HGB BLD-MCNC: 11.8 G/DL (ref 12–16)
MCH RBC QN AUTO: 26.8 PG (ref 27–33)
MCHC RBC AUTO-ENTMCNC: 32.9 G/DL (ref 32.2–35.5)
MCV RBC AUTO: 81.6 FL (ref 81.4–97.8)
PLATELET # BLD AUTO: 333 K/UL (ref 164–446)
PMV BLD AUTO: 10.5 FL (ref 9–12.9)
RBC # BLD AUTO: 4.4 M/UL (ref 4.2–5.4)
WBC # BLD AUTO: 19.6 K/UL (ref 4.8–10.8)

## 2025-05-01 PROCEDURE — 76815 OB US LIMITED FETUS(S): CPT

## 2025-05-01 PROCEDURE — 700105 HCHG RX REV CODE 258: Performed by: OBSTETRICS & GYNECOLOGY

## 2025-05-01 PROCEDURE — 87205 SMEAR GRAM STAIN: CPT

## 2025-05-01 PROCEDURE — 87077 CULTURE AEROBIC IDENTIFY: CPT | Mod: 91

## 2025-05-01 PROCEDURE — 700102 HCHG RX REV CODE 250 W/ 637 OVERRIDE(OP): Performed by: OBSTETRICS & GYNECOLOGY

## 2025-05-01 PROCEDURE — 700111 HCHG RX REV CODE 636 W/ 250 OVERRIDE (IP): Performed by: OBSTETRICS & GYNECOLOGY

## 2025-05-01 PROCEDURE — 770002 HCHG ROOM/CARE - OB PRIVATE (112)

## 2025-05-01 PROCEDURE — 700101 HCHG RX REV CODE 250: Performed by: OBSTETRICS & GYNECOLOGY

## 2025-05-01 PROCEDURE — 88305 TISSUE EXAM BY PATHOLOGIST: CPT | Performed by: PATHOLOGY

## 2025-05-01 PROCEDURE — 88305 TISSUE EXAM BY PATHOLOGIST: CPT | Mod: 26 | Performed by: PATHOLOGY

## 2025-05-01 PROCEDURE — 85027 COMPLETE CBC AUTOMATED: CPT

## 2025-05-01 PROCEDURE — 36415 COLL VENOUS BLD VENIPUNCTURE: CPT

## 2025-05-01 PROCEDURE — 3E0234Z INTRODUCTION OF SERUM, TOXOID AND VACCINE INTO MUSCLE, PERCUTANEOUS APPROACH: ICD-10-PCS | Performed by: OBSTETRICS & GYNECOLOGY

## 2025-05-01 PROCEDURE — 87186 SC STD MICRODIL/AGAR DIL: CPT

## 2025-05-01 PROCEDURE — 84112 EVAL AMNIOTIC FLUID PROTEIN: CPT

## 2025-05-01 PROCEDURE — 82962 GLUCOSE BLOOD TEST: CPT | Mod: 91

## 2025-05-01 PROCEDURE — 304965 HCHG RECOVERY SERVICES

## 2025-05-01 PROCEDURE — 700111 HCHG RX REV CODE 636 W/ 250 OVERRIDE (IP): Mod: JZ | Performed by: OBSTETRICS & GYNECOLOGY

## 2025-05-01 PROCEDURE — 87070 CULTURE OTHR SPECIMN AEROBIC: CPT

## 2025-05-01 PROCEDURE — A9270 NON-COVERED ITEM OR SERVICE: HCPCS | Performed by: OBSTETRICS & GYNECOLOGY

## 2025-05-01 PROCEDURE — 59409 OBSTETRICAL CARE: CPT

## 2025-05-01 PROCEDURE — 87075 CULTR BACTERIA EXCEPT BLOOD: CPT

## 2025-05-01 RX ORDER — CLINDAMYCIN PHOSPHATE 900 MG/50ML
900 INJECTION, SOLUTION INTRAVENOUS EVERY 8 HOURS
Status: DISCONTINUED | OUTPATIENT
Start: 2025-05-01 | End: 2025-05-01

## 2025-05-01 RX ORDER — IBUPROFEN 800 MG/1
800 TABLET, FILM COATED ORAL EVERY 8 HOURS PRN
Status: DISCONTINUED | OUTPATIENT
Start: 2025-05-01 | End: 2025-05-03 | Stop reason: HOSPADM

## 2025-05-01 RX ORDER — MAGNESIUM SULFATE HEPTAHYDRATE 40 MG/ML
2 INJECTION, SOLUTION INTRAVENOUS CONTINUOUS
Status: DISCONTINUED | OUTPATIENT
Start: 2025-05-01 | End: 2025-05-01

## 2025-05-01 RX ORDER — ACETAMINOPHEN 500 MG
1000 TABLET ORAL EVERY 6 HOURS PRN
Status: DISCONTINUED | OUTPATIENT
Start: 2025-05-01 | End: 2025-05-03 | Stop reason: HOSPADM

## 2025-05-01 RX ORDER — CLINDAMYCIN PHOSPHATE 900 MG/50ML
900 INJECTION, SOLUTION INTRAVENOUS EVERY 8 HOURS
Status: DISCONTINUED | OUTPATIENT
Start: 2025-05-02 | End: 2025-05-03 | Stop reason: HOSPADM

## 2025-05-01 RX ORDER — SODIUM CHLORIDE, SODIUM LACTATE, POTASSIUM CHLORIDE, CALCIUM CHLORIDE 600; 310; 30; 20 MG/100ML; MG/100ML; MG/100ML; MG/100ML
2000 INJECTION, SOLUTION INTRAVENOUS PRN
Status: DISCONTINUED | OUTPATIENT
Start: 2025-05-01 | End: 2025-05-03 | Stop reason: HOSPADM

## 2025-05-01 RX ORDER — MISOPROSTOL 200 UG/1
600 TABLET ORAL
Status: DISCONTINUED | OUTPATIENT
Start: 2025-05-01 | End: 2025-05-03 | Stop reason: HOSPADM

## 2025-05-01 RX ORDER — VITAMIN A ACETATE, BETA CAROTENE, ASCORBIC ACID, CHOLECALCIFEROL, .ALPHA.-TOCOPHEROL ACETATE, DL-, THIAMINE MONONITRATE, RIBOFLAVIN, NIACINAMIDE, PYRIDOXINE HYDROCHLORIDE, FOLIC ACID, CYANOCOBALAMIN, CALCIUM CARBONATE, FERROUS FUMARATE, ZINC OXIDE, CUPRIC OXIDE 3080; 12; 120; 400; 1; 1.84; 3; 20; 22; 920; 25; 200; 27; 10; 2 [IU]/1; UG/1; MG/1; [IU]/1; MG/1; MG/1; MG/1; MG/1; MG/1; [IU]/1; MG/1; MG/1; MG/1; MG/1; MG/1
1 TABLET, FILM COATED ORAL
Status: DISCONTINUED | OUTPATIENT
Start: 2025-05-02 | End: 2025-05-03 | Stop reason: HOSPADM

## 2025-05-01 RX ORDER — DOCUSATE SODIUM 100 MG/1
100 CAPSULE, LIQUID FILLED ORAL 2 TIMES DAILY
Status: DISCONTINUED | OUTPATIENT
Start: 2025-05-01 | End: 2025-05-03 | Stop reason: HOSPADM

## 2025-05-01 RX ADMIN — PRENATAL WITH FERROUS FUM AND FOLIC ACID 1 TABLET: 3080; 920; 120; 400; 22; 1.84; 3; 20; 10; 1; 12; 200; 27; 25; 2 TABLET ORAL at 09:28

## 2025-05-01 RX ADMIN — BETAMETHASONE SODIUM PHOSPHATE AND BETAMETHASONE ACETATE 12 MG: 3; 3 INJECTION, SUSPENSION INTRA-ARTICULAR; INTRALESIONAL; INTRAMUSCULAR at 09:23

## 2025-05-01 RX ADMIN — INDOMETHACIN 25 MG: 25 CAPSULE ORAL at 03:48

## 2025-05-01 RX ADMIN — GENTAMICIN SULFATE 350 MG: 40 INJECTION, SOLUTION INTRAMUSCULAR; INTRAVENOUS at 20:49

## 2025-05-01 RX ADMIN — CLINDAMYCIN IN 5 PERCENT DEXTROSE 900 MG: 18 INJECTION, SOLUTION INTRAVENOUS at 21:47

## 2025-05-01 RX ADMIN — MAGNESIUM SULFATE IN WATER 2 G/HR: 40 INJECTION, SOLUTION INTRAVENOUS at 03:51

## 2025-05-01 RX ADMIN — WATER 2.5 MILLION UNITS: 1 INJECTION INTRAMUSCULAR; INTRAVENOUS; SUBCUTANEOUS at 15:03

## 2025-05-01 RX ADMIN — OXYTOCIN 20 UNITS: 10 INJECTION, SOLUTION INTRAMUSCULAR; INTRAVENOUS at 20:58

## 2025-05-01 RX ADMIN — WATER 2.5 MILLION UNITS: 1 INJECTION INTRAMUSCULAR; INTRAVENOUS; SUBCUTANEOUS at 03:06

## 2025-05-01 RX ADMIN — WATER 2.5 MILLION UNITS: 1 INJECTION INTRAMUSCULAR; INTRAVENOUS; SUBCUTANEOUS at 18:46

## 2025-05-01 RX ADMIN — WATER 2.5 MILLION UNITS: 1 INJECTION INTRAMUSCULAR; INTRAVENOUS; SUBCUTANEOUS at 10:04

## 2025-05-01 RX ADMIN — METFORMIN HYDROCHLORIDE 1000 MG: 500 TABLET ORAL at 09:28

## 2025-05-01 RX ADMIN — INDOMETHACIN 25 MG: 25 CAPSULE ORAL at 16:02

## 2025-05-01 RX ADMIN — INDOMETHACIN 25 MG: 25 CAPSULE ORAL at 10:03

## 2025-05-01 RX ADMIN — OXYTOCIN 2 MILLI-UNITS/MIN: 10 INJECTION, SOLUTION INTRAMUSCULAR; INTRAVENOUS at 19:52

## 2025-05-01 RX ADMIN — SODIUM CHLORIDE, POTASSIUM CHLORIDE, SODIUM LACTATE AND CALCIUM CHLORIDE: 600; 310; 30; 20 INJECTION, SOLUTION INTRAVENOUS at 18:07

## 2025-05-01 RX ADMIN — AMPICILLIN SODIUM 2000 MG: 2 INJECTION, POWDER, FOR SOLUTION INTRAVENOUS at 19:50

## 2025-05-01 RX ADMIN — OXYTOCIN 125 ML/HR: 10 INJECTION, SOLUTION INTRAMUSCULAR; INTRAVENOUS at 22:53

## 2025-05-01 RX ADMIN — WATER 2.5 MILLION UNITS: 1 INJECTION INTRAMUSCULAR; INTRAVENOUS; SUBCUTANEOUS at 06:59

## 2025-05-01 ASSESSMENT — PAIN DESCRIPTION - PAIN TYPE
TYPE: ACUTE PAIN

## 2025-05-01 NOTE — PROGRESS NOTES
"  Labor and Delivery Antepartum Note    ID: 29 y.o. is a  with IUP at 24w2d    Primary Obstetrician: Ruben El M.D.    Assessing Obstetrician: Mary Miller MD    S: Pt c/o contractions which are getting more uncomfortable. Also per RN she is having a lot of fluid leaking on the pad which is red/clear/green in color. No fevers/chills.     ROS: 10 systems negative except as noted above.    O: /59   Pulse 97   Temp 36.1 °C (96.9 °F) (Temporal)   Ht 1.626 m (5' 4\")   Wt 86.2 kg (190 lb)   SpO2 95%   BMI 32.61 kg/m²    Gen: NAD, AAO  HEENT: NC/AT, MMM  Neck: supple  Abd: Gravid, soft, uterus NTTP, no rebound/guarding  Ext: WWP, 2+ DPP, no edema  Skin: no visible rash  Psy: mood good, affect normal and congruent  Pelvic: SVE 4/100/0 with palpable vertex, on SSE vaginal vault filled with red/brown/green copious amount of fluid with foul odor    NST Report Review:  NST: 150s, pos accels, intermittent variable decels  [place]: q 5 min    Recent Labs     25  0930 25  0730   WBC 19.3* 19.6*   RBC 4.62 4.40   HEMOGLOBIN 12.2 11.8*   HEMATOCRIT 37.5 35.9*   MCV 81.2* 81.6   MCH 26.4* 26.8*   RDW 39.5 40.0   PLATELETCT 320 333   MPV 11.0 10.5   NEUTSPOLYS 85.10*  --    LYMPHOCYTES 9.00*  --    MONOCYTES 4.60  --    EOSINOPHILS 0.10  --    BASOPHILS 0.40  --        Assessment:   29 y.o.  at 24w2d.    Chorioamnionitis  PPROM   labor  Category 2 FHR  S/p BMZ x 2  GBS neg  BMI 32    Plan:  Discussed with Dr. Lobo - patient has clinical signs of chorio (foul discolored amniotic fluid) and also having intermittent variable decelerations. Delivery is indicated. Baby is vertex.   Will start patient on Amp/Gent/Clinda for chorio  Start pitocin  Get OR ready for vaginal delivery in the OR  NICU notified  D/c PCN  Close observation  Anticipate  soon      Mary Miller M.D., 2025, 11:15 AM     "

## 2025-05-01 NOTE — CONSULTS
NICU Consult    I was asked to consult on Ms. Fede Kidd today. She is a 29 year old  at 24 weeks and 1 days gestation whose pregnancy was complicated by  labor with advance cervical dilation and insulin resistance. I met with Ms. Fede Kidd and her child's father today. They are expecting a baby boy. EFW of 743g. Obstretical plan for betamethasone and magnesium.     Today we discussed the average length of stay in the NICU, the differences in delivery room management for a ELBW premature infant, the need for likely mechanical ventilation for significant respiratory support, the overall nutritional strategy for infant's of this gestational age, including TPN via umbilical catheters/PICC lines as well as slow ramping up of enteral feeds. Mother does plan to breastfeed, which I supported and encouraged, and we discussed the importance of breast milk/donor breastmilk to premature infants.    Additionally, we discussed the overall immaturity of infants born at this age and the monitoring that would take place for the immature brain, eyes, lungs, intestines, and overall neurodevelopment, as ELBW infants are at increased risk for cognitive delays, CP, attention issues, and other developmental problems, that cannot be predicted at this juncture. We also talked about the overall survival for an infant born at this gestational age following steriods. I informed them that this survival rate increases with increasing gestational age. We also discussed some statistics for survival without serious neurologic sequale.     All questions were answered. ?     NICU remains available should further questions or concerns arise.    Mary Grimes MD  Neonatology

## 2025-05-01 NOTE — NST NOTE
Mony Mistry Marti   Gestational age: 24w2d     Indication: PTL    NST: 135 bpm / min variability / + accelerations overnight / intermittent decelerations.   [place]: rare CTX    Interpretation: minimal variability on magnesium / reactive overnight with intermittent decelerations. Overall appropriate for gestational age.     Plan: CFM.    Nick Lobo MD

## 2025-05-01 NOTE — PROGRESS NOTES
0700 - Report received from REAGAN Garcia. Pt comfortable at this time, denies needs. FOB at the bedside and supportive. POC discussed and questions addressed.   0715 - Incentive spirometer provided to pt who demonstrated use. Pt sat up and deep breathing and coughing encouraged. See flowsheet for assessment. Pt verbalizes understanding.  1205 - Dr Frost notified of postprandial BS. No new orders.  1600 - Dr Frost notified of BS, no new orders.  1636 - Pt reports occasionally feeling leaking. Dr Frost notified of leaking of fluid and fluid/blood on peripad. See order history.  1650 - Amnisure collected per orders. POC discussed and all questions addressed.  1704 - US at bedside  1814 - Pt reports feeling large gush of fluid. Peripad changed, appears saturated and slighly green tinged Dr Frost notified.  1835 - Dr Lobo contacted per Dr Frost, per Dr Lobo, perform SSE and assess for rupture of membranes. Reevaluate for latency antibiotics per exam. MD notified of variable decels. Dr Frost notified, will come to bedside.  1900 - Report to REAGAN Starr.

## 2025-05-01 NOTE — CARE PLAN
The patient is Unstable - High likelihood or risk of patient condition declining or worsening    Shift Goals  Clinical Goals: patient safety  Patient Goals: stay pregnant  Family Goals: support    Progress made toward(s) clinical / shift goals:    Problem: Skin Integrity  Goal: Skin integrity is maintained or improved  Outcome: Not Progressing  Note: Pt has no skin breakdown, adjusts position frequently and independently.      Problem: Risk for Injury  Goal: Patient and fetus will be free of preventable injury/complications  Outcome: Progressing  Note: EFM done per orders.     Problem: Pain  Goal: Patient's pain will be alleviated or reduced to the patient’s comfort goal  Outcome: Progressing  Note: Pt denies need for pain intervention at this time.       Patient is not progressing towards the following goals:      Problem: Skin Integrity  Goal: Skin integrity is maintained or improved  Outcome: Not Progressing  Note: Pt has no skin breakdown, adjusts position frequently and independently.

## 2025-05-01 NOTE — CONSULTS
Maternal Fetal Medicine Consultation    Date of Admission: 25      ID: 29 y.o.  with IUP at 24w1d on date of admission    [facility]    Reason for consultation: PTL    HPI: Mony Kidd is a 29 y.o.  at 24w2d admitted with concern for  labor with contractions and cervical change to 4-5 cm of dilation. The patient reports previous contractions and cramping have improved. Reports one significant episode of vaginal bleeding and discharge overnight. ROM reportedly ruled out by primary OB o/n. Endorses active fetal movement. Minimal ongoing VB reported on serial pad checks. Denies f/c, N/V. No CP/SOB/palpitation reported.     ROS: 10 systems reviewed and negative except as noted in HPI    OB History    Para Term  AB Living   1        SAB IAB Ectopic Molar Multiple Live Births              # Outcome Date GA Lbr Gary/2nd Weight Sex Type Anes PTL Lv   1 Current                Past Medical History:   Diagnosis Date    PCOS (polycystic ovarian syndrome)    - Migraines and depression.    History reviewed. No pertinent surgical history.    History reviewed. No pertinent family history.    Social History     Tobacco Use    Smoking status: Never    Smokeless tobacco: Never   Vaping Use    Vaping status: Never Used   Substance Use Topics    Alcohol use: Never    Drug use: Never       Medications:   No current facility-administered medications on file prior to encounter.     Current Outpatient Medications on File Prior to Encounter   Medication Sig Dispense Refill    metFORMIN (GLUCOPHAGE) 500 MG Tab Take 500 mg by mouth 2 times a day.      Prenatal MV-Min-Fe Fum-FA-DHA (PRENATAL 1 PO) Take  by mouth.         Allergies:  Patient has no known allergies.    Objective:   Vitals:    25 0812   BP:    Pulse: 97   Temp:    SpO2: 95%     General:  Alert, conversational, pleasant, no acute distress  Lungs:  Normal work of breathing.  Abdomen:  Soft, gravid,  non-tender, non-distended  SVE:    deferred    FHT: See separate NST report    Medications:   Current Facility-Administered Medications   Medication Dose Route Frequency Provider Last Rate Last Admin    betamethasone acetate-betamethasone sodium phosphate (Celestone) injection 12 mg  12 mg Intramuscular Q24HR Savita Boone M.D.   12 mg at 04/30/25 1017    indomethacin (Indocin) capsule 25 mg  25 mg Oral Q6HR Savita Boone M.D.   25 mg at 05/01/25 0348    lactated ringers infusion   Intravenous Continuous Savita Boone M.D. 75 mL/hr at 04/30/25 0959 New Bag at 04/30/25 0959    calcium GLUConate injection 1 g  1 g Intravenous Once PRN Savita Boone M.D.        magnesium sulfate 40 g/1000mL infusion  2 g/hr Intravenous Continuous Savita Boone M.D. 50 mL/hr at 05/01/25 0351 2 g/hr at 05/01/25 0351    lidocaine (XYLOCAINE) 1%  injection  20 mL Subcutaneous Once PRN Savita Boone M.D.        terbutaline (Brethine) injection 0.25 mg  0.25 mg Subcutaneous Once PRN Savita Boone M.D.        oxytocin (Pitocin) infusion bolus (for post delivery)  20 Units Intravenous Once Savita Boone M.D.   Held at 04/30/25 1030    Followed by    oxytocin (Pitocin) infusion (for post delivery)  125 mL/hr Intravenous Continuous Savita Boone M.D.   Held at 04/30/25 1115    oxytocin (Pitocin) injection 10 Units  10 Units Intramuscular Once PRN Savita Boone M.D.        ibuprofen (Motrin) tablet 800 mg  800 mg Oral Once PRN Savita Boone M.D.        acetaminophen (Tylenol) tablet 1,000 mg  1,000 mg Oral Once PRN Savita Boone M.D.        penicillin G potassium 2.5 million units in  mL IVPB  2.5 Million Units Intravenous Q4HRS Savita Boone M.D.   Stopped at 05/01/25 0729    metFORMIN (Glucophage) tablet 1,000 mg  1,000 mg Oral BID WITH MEALS Savita Boone M.D.   1,000 mg at 04/30/25 1840    prenatal plus vitamin (Stuartnatal 1+1) 27-1 MG tablet 1 Tablet  1 Tablet Oral Daily-0800 Savita Boone M.D.             Labs:   Recent Results (from the past 24 hours)   COD - Adult (Type and Screen)    Collection Time: 04/30/25  9:30 AM   Result Value Ref Range    ABO Grouping Only O     Rh Grouping Only POS     Antibody Screen-Cod NEG    CBC with differential    Collection Time: 04/30/25  9:30 AM   Result Value Ref Range    WBC 19.3 (H) 4.8 - 10.8 K/uL    RBC 4.62 4.20 - 5.40 M/uL    Hemoglobin 12.2 12.0 - 16.0 g/dL    Hematocrit 37.5 37.0 - 47.0 %    MCV 81.2 (L) 81.4 - 97.8 fL    MCH 26.4 (L) 27.0 - 33.0 pg    MCHC 32.5 32.2 - 35.5 g/dL    RDW 39.5 35.9 - 50.0 fL    Platelet Count 320 164 - 446 K/uL    MPV 11.0 9.0 - 12.9 fL    Neutrophils-Polys 85.10 (H) 44.00 - 72.00 %    Lymphocytes 9.00 (L) 22.00 - 41.00 %    Monocytes 4.60 0.00 - 13.40 %    Eosinophils 0.10 0.00 - 6.90 %    Basophils 0.40 0.00 - 1.80 %    Immature Granulocytes 0.80 0.00 - 0.90 %    Nucleated RBC 0.00 0.00 - 0.20 /100 WBC    Neutrophils (Absolute) 16.43 (H) 1.82 - 7.42 K/uL    Lymphs (Absolute) 1.73 1.00 - 4.80 K/uL    Monos (Absolute) 0.89 (H) 0.00 - 0.85 K/uL    Eos (Absolute) 0.02 0.00 - 0.51 K/uL    Baso (Absolute) 0.07 0.00 - 0.12 K/uL    Immature Granulocytes (abs) 0.15 (H) 0.00 - 0.11 K/uL    NRBC (Absolute) 0.00 K/uL   T.PALLIDUM AB TIFFANIE (Syphilis)    Collection Time: 04/30/25  9:30 AM   Result Value Ref Range    Syphilis, Treponemal Qual Non-Reactive Non-Reactive   GRP B STREP, BY PCR (MATTHEWS BROTH)    Collection Time: 04/30/25 10:20 AM    Specimen: Vaginal; Genital   Result Value Ref Range    Strep Gp B DNA PCR POSITIVE (A) Negative   POCT glucose device results    Collection Time: 04/30/25  9:51 PM   Result Value Ref Range    POC Glucose, Blood 115 (H) 65 - 99 mg/dL   AMNISURE ROM ASSAY    Collection Time: 04/30/25 10:02 PM   Result Value Ref Range    AmniSure ROM Negative Negative   POCT glucose device results    Collection Time: 05/01/25  6:50 AM   Result Value Ref Range    POC Glucose, Blood 130 (H) 65 - 99 mg/dL   CBC WITHOUT DIFFERENTIAL     Collection Time: 05/01/25  7:30 AM   Result Value Ref Range    WBC 19.6 (H) 4.8 - 10.8 K/uL    RBC 4.40 4.20 - 5.40 M/uL    Hemoglobin 11.8 (L) 12.0 - 16.0 g/dL    Hematocrit 35.9 (L) 37.0 - 47.0 %    MCV 81.6 81.4 - 97.8 fL    MCH 26.8 (L) 27.0 - 33.0 pg    MCHC 32.9 32.2 - 35.5 g/dL    RDW 40.0 35.9 - 50.0 fL    Platelet Count 333 164 - 446 K/uL    MPV 10.5 9.0 - 12.9 fL       Imaging: US-OB TRANSVAGINAL ONLY  Result Date: 4/30/2025 4/30/2025 8:39 AM HISTORY/REASON FOR EXAM:  Pain TECHNIQUE/EXAM DESCRIPTION: OB limited ultrasound. COMPARISON:  4/30/2025 FINDINGS: Fetal Lie:  Vertex LMP:  11/12/2024 Clinical DONNA by LMP:  08/19/2025 Placenta (Location):  Posterior Placenta Previa: No Placental ndGndrndanddndend:nd nd2nd Amniotic Fluid Volume:  DARYL = 10.10 cm Fetal Heart Rate:  158 bpm Cervical Length:  0.7 cm No maternal adnexal mass is identified. Fetal Biometry BPD    5.91 cm, 24 weeks, 2 days, (43 Percent) HC    22.03 cm, 24 weeks, 1 days, (29 Percent) AC    21.00 cm, 25 weeks, 4 days, (82 Percent) Femur Length    4.4 cm, 24 weeks, 2 days, (41 Percent) Humerus Length    4.19 cm, 25 weeks, 2 days, (70 Percent) EGA by this US:  24 weeks, 4 days DONNA by this US: 08/16/2025 DONNA by 1st US:  8/16/2025 Estimated Fetal Weight:  743 grams EFW Percentile: 74 Percent Comments:  Cervical thinning.     1.  Single live intrauterine pregnancy of an estimated gestational age of 24 weeks and 4 days with an estimated date of delivery of 08/16/2025. 2.  Marked thinning of the cervix. INTERPRETING LOCATION: [address] [place] [address], [id number]    US-OB LIMITED GROWTH FOLLOW UP Is the patient pregnant? Yes  Result Date: 4/30/2025 4/30/2025 8:55 AM HISTORY/REASON FOR EXAM:  Pain TECHNIQUE/EXAM DESCRIPTION: OB limited ultrasound. COMPARISON:  None FINDINGS: Fetal Lie:  Vertex LMP:  11/12/2024 Clinical DONNA by LMP:  08/19/2025 Placenta (Location):  Posterior Placenta Previa: No Placental ndGndrndanddndend:nd nd2nd Amniotic Fluid Volume:  DARYL = 10.10 cm Fetal Heart Rate:  158 bpm  Cervical Length:  0.7 cm No maternal adnexal mass is identified. Fetal Biometry BPD    5.91 cm, 24 weeks, 2 days, (43 Percent) HC    22.03 cm, 24 weeks, 1 days, (29 Percent) AC    21.00 cm, 25 weeks, 4 days, (82 Percent) Femur Length , 24 weeks, 2 days, (41 Percent) Humerus Length    4.19 cm, 25 weeks, 2 days, (70 Percent) Cerebellum Diameter , , ( ) EGA by this US:  24 weeks, 4 days DONNA by this US: 2025 DONNA by 1st US: Estimated Fetal Weight:  743 grams EFW Percentile: 74 Percent Comments:  Cervical thinning.     1.  Single live intrauterine pregnancy of an estimated gestational age of 24 weeks and 4 days with an estimated date of delivery of 2025. 2.  Marked thinning of the cervix.    A/P: 29 y.o.  at 24w2d admitted with  labor. The potential maternal and fetal risks associated with  labor with exposed amniotic membranes at the current gestational age were reviewed with the patient at length. If delivery occurs at the current gestational age there remains a very guarded prognosis for the developing fetus in regard to the risks associated with prematurity, potential for morbidity including permanent disability, and survival. This was discussed in detail with patient by Neonatology previously. The patient is clear that she desires full intervention for the fetus and expresses understanding regarding the risks as reviewed. Plan for ongoing expectant management at this time.     #  Labor   Serial pad checks  Mg for fetal neuroprotection  Indocin through BMZ window  BMZ  -   S/p NICU  CFM    #Insulin resistance  -QID glucose checks in the setting of recent BMZ administration.  -ISS PRN     Dispo: ongoing inpatient observation.     Nick Lobo MD

## 2025-05-01 NOTE — PROGRESS NOTES
RN messaged and patient with large amount of fluid/blood on her chucks. Reports that she felt like she was having discharge. SSE performed and membranes bulging through the os but do appear intact. Minimal blood in the vault. Amnisure and fern collected and sent though may have false positive sure to blood. SVE 4/80/-2 fetal head but membranes bulging. Patient is no longer feeling contractions. Will continue with Mag, Indocin and Pen G. Discussed expectant management. FHT Cat 1.     Savita Boone M.D.

## 2025-05-01 NOTE — CARE PLAN
The patient is Watcher - Medium risk of patient condition declining or worsening    Shift Goals  Clinical Goals: Magnesium infusion, indocin administration, stay pregnant  Patient Goals: Education, stay pregnant  Family Goals: Support    Progress made toward(s) clinical / shift goals:  Progressing    Problem: Knowledge Deficit - L&D  Goal: Patient and family/caregivers will demonstrate understanding of plan of care, disease process/condition, diagnostic tests and medications  Outcome: Progressing     Problem: Psychosocial - L&D  Goal: Patient's level of anxiety will decrease  Outcome: Progressing  Note: Will continue to include pt in POC and educate as necessary. Decreased stimuli in room.      Problem: Risk for Injury  Goal: Patient and fetus will be free of preventable injury/complications  Outcome: Progressing  Note: Continuous fetal and maternal monitoring.

## 2025-05-01 NOTE — PROGRESS NOTES
1915: Report from REAGAN Garcia.     Pt states she is feeling occasional contractions, but they are very mild compared to this morning. Does not feel any vaginal or rectal pressure at this time. Pt educated to call out with any change in contraction intensity, rectal/vaginal pressure, or feeling like something is in her vagina, or any other change in condition. Pt demonstrates understanding.    2141: Pt called out to nursing station asking to be cleaned up on her bottom because she feels wet. Pad underneath pt saturated with blood. MD Paolo notified.    Blood glucose 115.    2159: MD Paolo to bedside. Sterile speculum performed. FERN and Amnisure obtained. SVE: 4 cm. Per MD there is a BBOW and fetal head can be felt through the amniotic sac.    2252: MD Paolo notified of test results.    0640: MD Paolo to bedside to evaluate bleeding. Orders for CBC.    1900: Report to REAGAN Bloom.

## 2025-05-02 LAB
BACTERIA UR CULT: ABNORMAL
BACTERIA UR CULT: ABNORMAL
ERYTHROCYTE [DISTWIDTH] IN BLOOD BY AUTOMATED COUNT: 41.7 FL (ref 35.9–50)
GRAM STN SPEC: NORMAL
HCT VFR BLD AUTO: 34.5 % (ref 37–47)
HGB BLD-MCNC: 10.7 G/DL (ref 12–16)
MCH RBC QN AUTO: 25.9 PG (ref 27–33)
MCHC RBC AUTO-ENTMCNC: 31 G/DL (ref 32.2–35.5)
MCV RBC AUTO: 83.5 FL (ref 81.4–97.8)
PATHOLOGY CONSULT NOTE: NORMAL
PLATELET # BLD AUTO: 345 K/UL (ref 164–446)
PMV BLD AUTO: 10.3 FL (ref 9–12.9)
RBC # BLD AUTO: 4.13 M/UL (ref 4.2–5.4)
SIGNIFICANT IND 70042: ABNORMAL
SIGNIFICANT IND 70042: NORMAL
SITE SITE: ABNORMAL
SITE SITE: NORMAL
SOURCE SOURCE: ABNORMAL
SOURCE SOURCE: NORMAL
WBC # BLD AUTO: 17.7 K/UL (ref 4.8–10.8)

## 2025-05-02 PROCEDURE — 700102 HCHG RX REV CODE 250 W/ 637 OVERRIDE(OP): Performed by: OBSTETRICS & GYNECOLOGY

## 2025-05-02 PROCEDURE — 770002 HCHG ROOM/CARE - OB PRIVATE (112)

## 2025-05-02 PROCEDURE — 700105 HCHG RX REV CODE 258: Performed by: OBSTETRICS & GYNECOLOGY

## 2025-05-02 PROCEDURE — 700111 HCHG RX REV CODE 636 W/ 250 OVERRIDE (IP): Performed by: OBSTETRICS & GYNECOLOGY

## 2025-05-02 PROCEDURE — A9270 NON-COVERED ITEM OR SERVICE: HCPCS | Performed by: OBSTETRICS & GYNECOLOGY

## 2025-05-02 PROCEDURE — 36415 COLL VENOUS BLD VENIPUNCTURE: CPT

## 2025-05-02 PROCEDURE — 85027 COMPLETE CBC AUTOMATED: CPT

## 2025-05-02 RX ADMIN — AMPICILLIN SODIUM 2000 MG: 2 INJECTION, POWDER, FOR SOLUTION INTRAVENOUS at 20:17

## 2025-05-02 RX ADMIN — AMPICILLIN SODIUM 2000 MG: 2 INJECTION, POWDER, FOR SOLUTION INTRAVENOUS at 09:00

## 2025-05-02 RX ADMIN — CLINDAMYCIN IN 5 PERCENT DEXTROSE 900 MG: 18 INJECTION, SOLUTION INTRAVENOUS at 15:35

## 2025-05-02 RX ADMIN — AMPICILLIN SODIUM 2000 MG: 2 INJECTION, POWDER, FOR SOLUTION INTRAVENOUS at 14:33

## 2025-05-02 RX ADMIN — GENTAMICIN SULFATE 350 MG: 40 INJECTION, SOLUTION INTRAMUSCULAR; INTRAVENOUS at 20:56

## 2025-05-02 RX ADMIN — METFORMIN HYDROCHLORIDE 1000 MG: 500 TABLET ORAL at 18:10

## 2025-05-02 RX ADMIN — METFORMIN HYDROCHLORIDE 1000 MG: 500 TABLET ORAL at 09:08

## 2025-05-02 RX ADMIN — CLINDAMYCIN IN 5 PERCENT DEXTROSE 900 MG: 18 INJECTION, SOLUTION INTRAVENOUS at 07:52

## 2025-05-02 RX ADMIN — CLINDAMYCIN IN 5 PERCENT DEXTROSE 900 MG: 18 INJECTION, SOLUTION INTRAVENOUS at 23:41

## 2025-05-02 RX ADMIN — AMPICILLIN SODIUM 2000 MG: 2 INJECTION, POWDER, FOR SOLUTION INTRAVENOUS at 01:40

## 2025-05-02 RX ADMIN — DOCUSATE SODIUM 100 MG: 100 CAPSULE, LIQUID FILLED ORAL at 18:10

## 2025-05-02 ASSESSMENT — PAIN DESCRIPTION - PAIN TYPE: TYPE: ACUTE PAIN

## 2025-05-02 NOTE — PROGRESS NOTES
1900: Report received from Merle KIRK. Care assumed.   1920: Dr. Frost at bedside for SVE exam. Pooling and foul odor noted. Pitocin ordered for induction and abx switched. Dr. Frost to place orders.  1935: Magnesium rate ordered to be decreased from 2g/hr to 1g/hr.   2022: Pt c/o severe pressure with contractions. SVE 6-7/90. Dr. Frost notified.  2035: Pt feeling urge to push. Dr. Frost notified. Pt moved to OR 2 for delivery. NICU and RT in OR2 for delivery.  2050: Delivery of baby boy.  2058: Delivery of placenta  2245: Pt ambulated to bathroom in stable condition at this time. Pt able to void. Kellee pads changed.  2345: Pt transferred to  in stable condition via wheel chair with belongings. Report given to Emmie KIRK.

## 2025-05-02 NOTE — PROGRESS NOTES
Pharmacy Gentamicin Kinetics Note for 2025     29 y.o. female on Gentamicin day # 1    Gentamicin Indication: Maternal fever     Provider specified end date: 25    Active Antibiotics (From admission, onward)      Ordered     Ordering Provider       Thu May 1, 2025  7:42 PM    25  gentamicin (Garamycin) 350 mg in  mL IVPB  EVERY 24 HOURS         Mary Miller M.D.       Thu May 1, 2025  7:32 PM    25  ampicillin (Omnipen) 2,000 mg in  mL IVPB  EVERY 6 HOURS         Mary Miller M.D.    25  clindamycin (Cleocin) IVPB premix 900 mg  EVERY 8 HOURS         Mary Miller M.D.    25  MD Alert...Gentamicin per Pharmacy  PHARMACY TO DOSE        Question:  Indication(s) for aminoglycoside?  Answer:  Other (comment)  Comment:  chorioamnionitis    Mary Miller M.D.            Dosing Weight: 70.5 kg (155 lb 6.8 oz)    Admission History: Admitted on 2025 for  contractions [O47.00]  Indication for care in labor or delivery [O75.9]   Pertinent history: Pt is 29 female started on ampicillin, gentamicin for chorioamnionitis    Allergies:     Patient has no known allergies.     Pertinent cultures to date:      Results       Procedure Component Value Units Date/Time    URINE CULTURE(NEW) [[id number]] Collected: 25 0550    Order Status: Completed Specimen: Urine, Clean Catch Updated: 25 1211     Significant Indicator NEG     Source UR     Site URINE, CLEAN CATCH     Culture Result Culture in progress.    GRP B STREP, BY PCR (MATTHEWS BROTH) [[id number]]  (Abnormal) Collected: 25 1020    Order Status: Completed Specimen: Genital from Vaginal Updated: 25 1234     Strep Gp B DNA PCR POSITIVE    Narrative:      21 tel. [phone] 2025, 12:34, RB PERF. RESULTS CALLED TO: RN [id number]    GRP B STREP, BY PCR (DIRECT) [[id number]] Collected: 25 1020    Order Status: Canceled Specimen: Genital from Vaginal     WET PREP  "[[id number]] Collected: 25 0800    Order Status: Completed Specimen: Genital from Vaginal Updated: 25 0910     Significant Indicator NEG     Source GEN     Site VAGINAL     Wet Prep For Parasites Many WBCs seen.  Few clue cells seen.      Urinalysis, Culture if indicated [[id number]]     Order Status: No result Specimen: Urine, Clean Catch             Labs:    CrCl cannot be calculated (Patient's most recent lab result is older than the maximum 7 days allowed.).  Recent Labs     25  0930 25  0730   WBC 19.3* 19.6*   NEUTSPOLYS 85.10*  --      No results for input(s): \"BUN\", \"CREATININE\", \"ALBUMIN\" in the last 72 hours.  No results for input(s): \"GENTTROUGH\", \"GENTPEAK\", \"GENTRANDOM\" in the last 72 hours.    Intake/Output Summary (Last 24 hours) at 2025  Last data filed at 2025 1800  Gross per 24 hour   Intake --   Output 4225 ml   Net -4225 ml     /62   Pulse 96   Temp 36.4 °C (97.5 °F) (Temporal)   Resp 18   Ht 1.626 m (5' 4\")   Wt 86.2 kg (190 lb)   SpO2 97%  Temp (24hrs), Av.2 °C (97.2 °F), Min:36.1 °C (96.9 °F), Max:36.4 °C (97.5 °F)      List concerns for Gentamicin clearance:     None    A/P:     - Gentamicin dose: 350 mg IV q24h     - Next gentamicin level: None ordered    - Goal trough: 0.5-1 mcg/mL    - Comments: Pharmacy will continue to monitor    Lina Bonilla, NixonD      "

## 2025-05-02 NOTE — DISCHARGE PLANNING
":     Referral: John-peter demise     Intervention:  Met with parents: Navneet Kidd and Mony Mistry Marti who delivered baby boy they named Navneet at 24.3 weeks.  Infant was in the NICU with prematurity and intraventricular hemorrhage.  Infant was made comfort care and DNR and  at 4:52 am.  This is parent's first baby.  Met with parents and provided \"Helpful Information for This Difficult Time\" packet with grief, support, counseling, and mortuary information.  Discussed options of burial and cremation and need to select a  home.  Parents have excellent support from their family and friends.  The  met with family and baptized Navneet last night.  Parents deny any further questions or needs at this time.  Support provided to family.  "

## 2025-05-02 NOTE — L&D DELIVERY NOTE
Vaginal Delivery Procedure Note:    Mony Kidd is a 29 y.o. , now Para 0101     Weeks of gestation: 24w2d    Diagnosis:   Chorioamnionitis  PPROM   labor  Category 2 FHR  S/p BMZ x 2  GBS neg  BMI 32    Pt was taken to the OR due to active labor and near complete dilation. NICU in OR also at bedside.    of viable male infant over intact perineum at . Vertex, OA. Nuchal cord x 1 present, reduced once infant delivered. Shoulders delivered with ease immediately after head delivered. Nose and mouth suctioned with bulb. Infant placed on maternal abdomen in a warming bag. Delayed cord clamp performed for 30 seconds. Cord then clamped and cut by FOB.  APGARs - pending  Birth weight - pending  Placenta delivered intact with fundal massage and gentle cord traction at . 3 Vessel cord. Placenta was shiny white with foul odor c/w chorio.  Laceration: none  Excellent hemostasis.   mL  Anesthesia - none  Placental cultures obtained.  Sponge and needle counts correct.  Patient tolerated procedure well. Mother in stable condition. Baby was taken to the NICU due to prematurity.

## 2025-05-02 NOTE — CARE PLAN
Problem: Knowledge Deficit - Postpartum  Goal: Patient will verbalize and demonstrate understanding of self and infant care  Outcome: Progressing     Problem: Psychosocial - Postpartum  Goal: Patient will verbalize and demonstrate effective bonding and parenting behavior  Outcome: Progressing     Problem: Altered Physiologic Condition  Goal: Patient physiologically stable as evidenced by normal lochia, palpable uterine involution and vitals within normal limits  Outcome: Progressing     Problem: Infection - Postpartum  Goal: Postpartum patient will be free of signs and symptoms of infection  Outcome: Progressing   The patient is Watcher - Medium risk of patient condition declining or worsening    Shift Goals  Clinical Goals: vitals stable, fundus firm, ambulating and voiding without difficulty  Patient Goals: pain control  Family Goals: support    Progress made toward(s) clinical / shift goals:  Mony is postpartum with her first baby (who is a micro-preemie), her vitals are stable, her fundus is firm, she is ambulating and voiding without difficulty, she has no pain, her SO is at bedside for support, together they are visiting NICU    Patient is not progressing towards the following goals:

## 2025-05-02 NOTE — CARE PLAN
The patient is Watcher - Medium risk of patient condition declining or worsening    Shift Goals  Clinical Goals: vitals stable, fundus firm, ambulating and voiding without difficulty  Patient Goals: pain control  Family Goals: support    Progress made toward(s) clinical / shift goals:    Problem: Altered Physiologic Condition  Goal: Patient physiologically stable as evidenced by normal lochia, palpable uterine involution and vitals within normal limits  Outcome: Progressing     Problem: Infection - Postpartum  Goal: Postpartum patient will be free of signs and symptoms of infection  Outcome: Progressing   Abx x3 given per MAR. VS WDL over shift.      Patient is not progressing towards the following goals:

## 2025-05-02 NOTE — PROGRESS NOTES
S: Pt just returned from NICU, baby passed away due to prematurity and intracranial hemorrhages, lochia small, pain controlled, voiding    O:   Vitals:    25 0000   BP: 117/74   Pulse: (!) 102   Resp: 18   Temp: 36.3 °C (97.4 °F)   SpO2: 95%     Gen - NAD, comfortable  Abd - soft, nontender, nondistended, no rebound or guarding  Fundus - firm, nontender, below umbilicus  Ext - nontender, no edema    Recent Labs     25  0930 25  0730   WBC 19.3* 19.6*   RBC 4.62 4.40   HEMOGLOBIN 12.2 11.8*   HEMATOCRIT 37.5 35.9*   MCV 81.2* 81.6   MCH 26.4* 26.8*   RDW 39.5 40.0   PLATELETCT 320 333   MPV 11.0 10.5   NEUTSPOLYS 85.10*  --    LYMPHOCYTES 9.00*  --    MONOCYTES 4.60  --    EOSINOPHILS 0.10  --    BASOPHILS 0.40  --        A: PPD#1 s/p  at 24wks   demise due to prematurity  Clinical chorioamnionitis    P: Continue routine postpartum care, Amp/Gent/Clinda antibiotics for at least 24hrs postpartum, d/c home on PPD#2

## 2025-05-02 NOTE — PROGRESS NOTES
Patient admitted from labor and delivery via wheelchair, transferred to bed on her own, SO at bedside, oriented to room and call lights, bedside paperwork and visiting hours reviewed, all questions answered at this time.    0600- Dr. Bee

## 2025-05-02 NOTE — PROGRESS NOTES
Neonatologist at [facility] bedside to give update on baby.   0305: pt taken to NICU in wheelchair; escorted by CNA and FOB.

## 2025-05-03 PROCEDURE — A9270 NON-COVERED ITEM OR SERVICE: HCPCS | Performed by: OBSTETRICS & GYNECOLOGY

## 2025-05-03 PROCEDURE — 700102 HCHG RX REV CODE 250 W/ 637 OVERRIDE(OP): Performed by: OBSTETRICS & GYNECOLOGY

## 2025-05-03 PROCEDURE — 700111 HCHG RX REV CODE 636 W/ 250 OVERRIDE (IP): Mod: JZ | Performed by: OBSTETRICS & GYNECOLOGY

## 2025-05-03 PROCEDURE — 700105 HCHG RX REV CODE 258: Performed by: OBSTETRICS & GYNECOLOGY

## 2025-05-03 RX ORDER — IBUPROFEN 800 MG/1
800 TABLET, FILM COATED ORAL EVERY 8 HOURS PRN
Qty: 30 TABLET | Refills: 1 | Status: SHIPPED | OUTPATIENT
Start: 2025-05-03

## 2025-05-03 RX ADMIN — AMPICILLIN SODIUM 2000 MG: 2 INJECTION, POWDER, FOR SOLUTION INTRAVENOUS at 08:18

## 2025-05-03 RX ADMIN — CLINDAMYCIN IN 5 PERCENT DEXTROSE 900 MG: 18 INJECTION, SOLUTION INTRAVENOUS at 07:08

## 2025-05-03 RX ADMIN — METFORMIN HYDROCHLORIDE 1000 MG: 500 TABLET ORAL at 07:03

## 2025-05-03 RX ADMIN — DOCUSATE SODIUM 100 MG: 100 CAPSULE, LIQUID FILLED ORAL at 06:17

## 2025-05-03 RX ADMIN — AMPICILLIN SODIUM 2000 MG: 2 INJECTION, POWDER, FOR SOLUTION INTRAVENOUS at 02:07

## 2025-05-03 ASSESSMENT — EDINBURGH POSTNATAL DEPRESSION SCALE (EPDS)
I HAVE FELT SCARED OR PANICKY FOR NO GOOD REASON: NO, NOT MUCH
THINGS HAVE BEEN GETTING ON TOP OF ME: YES, SOMETIMES I HAVEN'T BEEN COPING AS WELL AS USUAL
I HAVE BLAMED MYSELF UNNECESSARILY WHEN THINGS WENT WRONG: YES, SOME OF THE TIME
THE THOUGHT OF HARMING MYSELF HAS OCCURRED TO ME: NEVER
I HAVE BEEN ANXIOUS OR WORRIED FOR NO GOOD REASON: HARDLY EVER
I HAVE FELT SAD OR MISERABLE: YES, QUITE OFTEN
I HAVE BEEN SO UNHAPPY THAT I HAVE BEEN CRYING: YES, QUITE OFTEN
I HAVE LOOKED FORWARD WITH ENJOYMENT TO THINGS: RATHER LESS THAN I USED TO
I HAVE BEEN ABLE TO LAUGH AND SEE THE FUNNY SIDE OF THINGS: NOT QUITE SO MUCH NOW
I HAVE BEEN SO UNHAPPY THAT I HAVE HAD DIFFICULTY SLEEPING: YES, SOMETIMES

## 2025-05-03 NOTE — CARE PLAN
The patient is Stable - Low risk of patient condition declining or worsening    Shift Goals  Clinical Goals: rest, pain control, VSS  Patient Goals: pain control  Family Goals: support    Progress made toward(s) clinical / shift goals:  remains clinically stable  Problem: Altered Physiologic Condition  Goal: Patient physiologically stable as evidenced by normal lochia, palpable uterine involution and vitals within normal limits  Outcome: Progressing  Note: Fundus firm at U, lochia rubra minimal. VSS     Problem: Infection - Postpartum  Goal: Postpartum patient will be free of signs and symptoms of infection  Outcome: Progressing  Note: Remains free from signs of infection, afebrile.        Patient is not progressing towards the following goals:

## 2025-05-03 NOTE — PROGRESS NOTES
Hospital Day : 3    S: DOing well; juan ramon diet; min bleed; has good support    O:  Vitals:    25 1417 25 1820 25 0219 25 0601   BP: 102/63 122/73 95/50 108/60   Pulse: 82 82 71 64   Resp: 18 18 18 18   Temp: 36.9 °C (98.4 °F) 36.9 °C (98.4 °F) 36.6 °C (97.9 °F) 36.8 °C (98.2 °F)   TempSrc: Temporal Temporal Temporal Temporal   SpO2: 96% 97% 97% 96%   Weight:       Height:           Recent Labs     25  0930 25  0730 25  0833   WBC 19.3* 19.6* 17.7*   RBC 4.62 4.40 4.13*   HEMOGLOBIN 12.2 11.8* 10.7*   HEMATOCRIT 37.5 35.9* 34.5*   MCV 81.2* 81.6 83.5   MCH 26.4* 26.8* 25.9*   MCHC 32.5 32.9 31.0*   RDW 39.5 40.0 41.7   PLATELETCT 320 333 345   MPV 11.0 10.5 10.3             Abd soft ff    A: pp1 sp  at 24 weeks with  demise; clinical chorio and afeb    P: dc

## 2025-05-03 NOTE — LACTATION NOTE
Lactation Consultation:    Met with Mony and her , per their request, to discuss lactation induction and milk donation following the birth and death of their son, Navneet. Mony had begun pumping her breasts for baby Navneet, and is now considering donation of her breast milk. She is provided with information for milk donation through [facility] [employer] [facility], as well as contact information for local [facility]. Anticipatory guidance reviewed regarding milk volumes in early post-partum period. We discussed pump frequency for the establishment of a mature milk supply, and reviewed the importance of regular milk expression, once milk is in, to prevent engorgement/mastitis. Mony is provided with the opportunity to ask questions, which are answered to her satisfaction. She is encouraged to request additional lactation support, as needed, throughout hospital stay.    [facility] Breastfeeding Resource list and P pump packet provided to patient.

## 2025-05-03 NOTE — PROGRESS NOTES
0700- POC discussed with pt, MD, NOC Rn. Per MD Mas, pt to receive AM doses of ABX and the rest to be discontinued. Plan for discharge home today. Depression screening and birth certificate forms at bedside. Pt reports she will fill out this morning. Fundus firm, lochia light. No needs at this time.     0930- elevated EPDS, SW aware. F/u appointment with OB in 1 week. Pt reports screening is higher due to current situation, but had been feeling well during pregnancy. Reports strong support system.

## 2025-05-03 NOTE — DISCHARGE SUMMARY
DATE OF ADMISSION:  2025   DATE OF DISCHARGE:  2025     OB-GYN DISCHARGE SUMMARY     ADMITTING DIAGNOSES:  Pregnancy at 24 and 1/7 weeks with  labor, beta   strep unknown and insulin resistance.     DISCHARGE DIAGNOSIS:  Status post vaginal delivery with clinical chorio and    demise.     HOSPITAL COURSE IN DETAIL:  This patient was admitted on the aforementioned   date with the aforementioned diagnoses.  She was started on mag, received   betamethasone, and IV penicillin at 4-5 cm dilated.  She was started on ____   after rupturing for signs of chorio and eventually delivered her male infant   on the 1st in the operating room.  The patient recovered in stable condition.    Baby was taken to the NICU where he subsequently .  On postpartum day   #1, she was doing well without complaint.  She had minimal lochia.  She was   tolerating a regular diet, ambulating, and voiding.  Today postpartum day #2,   she desires discharge home.  She is afebrile.  Her vital signs are within   normal limits.  Her abdomen is soft with a full fundus below the umbilicus.  H   and H is stable at 10 and 34.     ASSESSMENT: At this time is postoperative day 1, status post normal   spontaneous vaginal delivery at 24+ weeks with  demise, clinical   chorio, afebrile, status post IV antibiotics, doing well.  Desires discharge   home.     PLAN:  At this time,  1.  Discharge home.  2.  Follow up in 1 week.  3.  Pelvic rest.  4.  Lifting precautions.  5.  Scripts for Motrin consented and written.        ______________________________  MD JUAN Reynoso/SERENE    DD:  2025 06:48  DT:  2025 07:19    Job#:  [id number]

## 2025-05-03 NOTE — PROGRESS NOTES
1500- discharge instructions provided and signed. F/u appointments discussed. All printed topics discussed. Emphasis provided on s/sx infection, bleeding, PPD, and when to call MD.    Pt escorted to vehicle via wheelchair with CNA.

## 2025-05-03 NOTE — PROGRESS NOTES
Report received from Rachel KIRK outside the room as patient has a lot of visitors at this time. Infant(demise) cuddled by relative. Will continue to monitor.

## 2025-05-04 LAB
BACTERIA WND AEROBE CULT: ABNORMAL
BACTERIA WND AEROBE CULT: ABNORMAL
GRAM STN SPEC: ABNORMAL
SIGNIFICANT IND 70042: ABNORMAL
SITE SITE: ABNORMAL
SOURCE SOURCE: ABNORMAL

## 2025-05-05 VITALS
WEIGHT: 190 LBS | BODY MASS INDEX: 32.44 KG/M2 | HEIGHT: 64 IN | DIASTOLIC BLOOD PRESSURE: 67 MMHG | HEART RATE: 87 BPM | TEMPERATURE: 98.7 F | RESPIRATION RATE: 18 BRPM | SYSTOLIC BLOOD PRESSURE: 111 MMHG | OXYGEN SATURATION: 97 %

## 2025-05-05 LAB
BACTERIA SPEC ANAEROBE CULT: NORMAL
SIGNIFICANT IND 70042: NORMAL
SITE SITE: NORMAL
SOURCE SOURCE: NORMAL

## 2025-05-20 ENCOUNTER — HOSPITAL ENCOUNTER (OUTPATIENT)
Dept: LAB | Facility: MEDICAL CENTER | Age: 30
End: 2025-05-20
Attending: OBSTETRICS & GYNECOLOGY
Payer: COMMERCIAL

## 2025-05-20 PROCEDURE — 85613 RUSSELL VIPER VENOM DILUTED: CPT

## 2025-05-20 PROCEDURE — 85520 HEPARIN ASSAY: CPT

## 2025-05-20 PROCEDURE — 85610 PROTHROMBIN TIME: CPT

## 2025-05-20 PROCEDURE — 86147 CARDIOLIPIN ANTIBODY EA IG: CPT | Mod: 91

## 2025-05-20 PROCEDURE — 86038 ANTINUCLEAR ANTIBODIES: CPT

## 2025-05-20 PROCEDURE — 86146 BETA-2 GLYCOPROTEIN ANTIBODY: CPT | Mod: 91

## 2025-05-20 PROCEDURE — 36415 COLL VENOUS BLD VENIPUNCTURE: CPT

## 2025-05-20 PROCEDURE — 85730 THROMBOPLASTIN TIME PARTIAL: CPT

## 2025-05-22 LAB — NUCLEAR IGG SER QL IA: NORMAL

## 2025-05-23 LAB
APTT SCREEN TO CONFIRM RATIO: 1.08 {RATIO}
B2 GLYCOPROT1 IGG SERPL IA-ACNC: <10 SGU
B2 GLYCOPROT1 IGM SERPL IA-ACNC: <10 SMU
CARDIOLIPIN IGG SER IA-ACNC: <10 GPL
CARDIOLIPIN IGM SER IA-ACNC: <10 MPL
CONFIRM DRVVT STA-STACLOT: ABNORMAL S
DRVVT SCREEN TO CONFIRM RATIO: 1.12 {RATIO}
DRVVT SCREEN TO CONFIRM RATIO: 1.25 {RATIO}
DRVVT/DRVVT CFM P DOAC NEUT NORM PPP-RTO: ABNORMAL {RATIO}
HEPARIN NT PPP QL: ABNORMAL
LA 3 SCREEN W REFLEX-IMP: ABNORMAL
LMW HEPARIN IND PLT AB SER QL: ABNORMAL
MIXING DRVVT: 1.08 S
PROTHROMBIN TIME: 13 S (ref 12–15.5)
SCREEN APTT: ABNORMAL S
THROMBIN TIME: ABNORMAL S

## 2025-06-17 NOTE — Clinical Note
REFERRAL APPROVAL NOTICE         Sent on June 17, 2025                   Mony Kidd  [address] [place] [address]                   Dear Ms. Fede Kidd,    After a careful review of the medical information and benefit coverage, [facility] has processed your referral. See below for additional details.    If applicable, you must be actively enrolled with your insurance for coverage of the authorized service. If you have any questions regarding your coverage, please contact your insurance directly.    REFERRAL INFORMATION   Referral #:  [id number]  Referred-To Department    Referred-By Provider:  Maternal Fetal Medicine    Ruben El M.D.   Maternal Fetal Med      [address]  [place] [address]  [phone] [address], [address]  [place] [id number] [phone]    Referral Start Date:  06/17/2025  Referral End Date:   06/17/2026             SCHEDULING  If you do not already have an appointment, please call [phone] to make an appointment.     MORE INFORMATION  If you do not already have a [email or web address] account, sign up at: [email or web address]  You can access your medical information, make appointments, see lab results, billing information, and more.  If you have questions regarding this referral, please contact  the [facility] Referrals department at:             [phone]. Monday - Friday 8:00AM - 5:00PM.     Sincerely,    [facility]

## 2025-06-17 NOTE — Clinical Note
Member Name: Mony Kidd   Member Number: [id number]   Reference Number: [id number]   Approved Services: Consultation   Approved Service Dates: 06/17/2025 - 06/17/2026   Requesting Provider: Ruben El   Requested Provider[facility]     Dear Mony Kidd:     The following medical service(s) requested by Ruben El have been approved:    Procedure Code Procedure Code Name Requested Quantity Approved Quantity Status   92310 (CPT®) [place] OFFICE/OUTPATIENT NEW MODERATE MDM 45 MINUTES 1 1 Authorized   89289 (CPT®) [place] OFFICE/OUTPATIENT ESTABLISHED MOD MDM 30 MIN 5 5 Authorized       Approved Quantity means the number of visits approved for medication treatments and/or medical services.    The services should be provided by [facility] no later than 06/17/2026. Please contact the provider listed below with any questions.     Provider Information:  [facility]  [phone]    Your plan benefit may require a deductible, co-payment or coinsurance for these services. This authorization does not guarantee [facility] will pay the claim for services that you receive. Payment by [facility] for these services is subject to the terms of your Evidence of Coverage or Summary Plan Description, your eligibility at the time of service, and confirmation of benefit coverage.    For any questions or additional information, please contact Customer Service:    [facility]  Customer Service: [phone] or toll free [phone]  TTY users dial: [phone]   Call Center Hours: Mon - Fri 7 AM to 8 PM PST   Office Hours: Mon - Fri 8 AM to 5 PM [facility]   E-mail: [email or web address]   Website: [email or web address]       This information is available for free in other languages. Please contact Customer Service at the phone number above for more information. [facility] complies with applicable Federal civil rights laws and does not  discriminate on the basis of race, color, national origin, age, disability or sex.      Sincerely,     Healthcare Utilization Management Department     Cc: [facility]   Ruben El

## 2025-07-23 ENCOUNTER — APPOINTMENT (OUTPATIENT)
Dept: MATERNAL FETAL MEDICINE | Facility: MEDICAL CENTER | Age: 30
End: 2025-07-23
Payer: COMMERCIAL

## 2025-07-30 ENCOUNTER — OFFICE VISIT (OUTPATIENT)
Dept: MATERNAL FETAL MEDICINE | Facility: MEDICAL CENTER | Age: 30
End: 2025-07-30
Payer: COMMERCIAL

## 2025-07-30 VITALS
BODY MASS INDEX: 33.56 KG/M2 | DIASTOLIC BLOOD PRESSURE: 85 MMHG | WEIGHT: 195.5 LBS | HEART RATE: 94 BPM | SYSTOLIC BLOOD PRESSURE: 115 MMHG

## 2025-07-30 DIAGNOSIS — Z31.69 ENCOUNTER FOR PRECONCEPTION CONSULTATION: Primary | ICD-10-CM

## 2025-07-31 NOTE — PROGRESS NOTES
MATERNAL FETAL MEDICINE CONSULTATION:    CC:  Hx of  birth at 24 weeks with chorioamnionitis.    HPI:  This is a 29-year-old  currently not pregnant recently delivered in May 2025 at 24 weeks a liveborn male weighing 1 pound 8.4 ounces.  She presented with a 2-day prodrome of increasing vaginal discharge and cramping and on the day of the admission experienced increased frequency of the cramping with bleeding.  Her initial evaluation indicated the cervix was 4-5 cm dilated and completely effaced.  Patient received magnesium sulfate, betamethasone but shortly after admission had spontaneous rupture membranes.  She subsequently was induced and delivered vaginally.  Pathology examined the placenta and there were histological findings consistent with chorioamnionitis and funisitis    Patient presents with her partner for an explanation of the cause of chorioamnionitis but also recommendations for future pregnancy.    PMHX:  PCOS: Metformin 1000 mg twice daily.  She denies any history of asthma, seizures, hypertension, diabetes mellitus, cardiac, respiratory, GI,  or other endocrine disorders    OPERATIONS: Denies    OB HX:   24 WEEKS MALE, 1-8.4, ,  labor, PPROM, chorioamnionitis. IUI conception.    TRANSFUSIONS: Denies    ALLERGIES: Denies    STI: Denies    MEDICATIONS: Metformin    HABITS: Denies    WEIGHT: 195.5 pounds  BP: 115/ 85 mmHg    DISCUSSION:  We discussed there are numerous causes for  labor and PPROM.  In addition chorioamnionitis can occur either through transplacental or transcervical routes.  It is often difficult for us to determine how or why these complications occur.  Further, it can be very difficult to determine whether the chorioamnionitis was the cause for the  labor and PPROM or vice versa.    Chorioamnionitis can occur due to rupture of membranes, transplacental and/or transcervical route.  Organisms associated with transplacental route include  Listeria, pneumococcus, and syphilis.  Transcervical or ascending infections are usually associated with Ureaplasma, mycoplasma, Gardnerella, Fusobacterium, E. coli, Bacteroides, yeast, or viruses.  In identifying the organism involved can be difficult and best detected by amniocentesis.    We discussed in some cases we can identify by ultrasound the presence of sludge which is associated, but not diagnostic, of chorioamnionitis.    Because of these uncertainties, in a future pregnancy, it would be prudent to monitor her cervical length for shortening or funneling of the cervix.  Should there be evidence of cervical change between 16 to 24 weeks she would be a candidate for progesterone and/or cerclage depending on circumstance.  Prophylactic progesterone has not been shown to be efficacious in the circumstances.    We also discussed pregnancies conceived by reproductive assistance including IUI appeared to have a higher complication rate over spontaneously conceived pregnancies.  This can include  birth with or without rupture membranes.  Some recent studies have suggested there may be an increased risk of birth defects with IUI.  We reviewed the role of a detailed ultrasound at 19 to 20 weeks.    We also discussed the importance of allowing themselves to emotionally and psychologically heal from their experience.  I recommended couples therapy as an experienced such as theirs can be quite traumatic and have long-term effects for both of them and potentially any future children.  The grieving process can be difficult and the assistance of a therapist and involvement in support groups can be extremely helpful.  Benefits of therapy can include improvement in their resilience, relationship, and ability to cope with the stresses of a future pregnancy.  We discussed the phenomenon of anniversary syndrome and having a therapist can help them navigate those events.  Grieving can take 6 to 12 months on average.    I  did recommend that she consider weight reduction during this phase of recovery.  Weight reduction is associated with improved pregnancy outcomes, improve fertility, and fewer pregnancy complications such as preeclampsia and gestational diabetes.  I did remind her that she should be on prenatal vitamins for at least a month prior to conceiving a future pregnancy for risk reduction of neural tube defects.    All questions were answered to their satisfaction.  Please advise us if we can be of any further assistance.  No follow-up appointments have been scheduled.    ASSESSMENT:  Hx of second trimester  birth with chorioamniotis.  Cervical insufficiency cannot be excluded.  PCOS  Hx of IUI pregnancy    RECOMMENDATION:  Psychological counseling  Weight reduction  Folic acid supplementation at least one month prior to conception of future pregnancy.  Once conception has occurred, first trimester ultrasound.  Cervical surveillance weekly from 15-16 weeks gestation to 24 weeks.  Detailed ultrasound at 19-20 weeks.    New outpt office visit with moderate complexity MDM with 50 minutes consultation and review of records.   [id number]